# Patient Record
Sex: MALE | Race: WHITE | Employment: OTHER | URBAN - METROPOLITAN AREA
[De-identification: names, ages, dates, MRNs, and addresses within clinical notes are randomized per-mention and may not be internally consistent; named-entity substitution may affect disease eponyms.]

---

## 2019-06-20 PROBLEM — H25.11 AGE-RELATED NUCLEAR CATARACT, RIGHT EYE: Chronic | Status: ACTIVE | Noted: 2019-06-20

## 2019-06-24 PROBLEM — H25.11 AGE-RELATED NUCLEAR CATARACT, RIGHT EYE: Chronic | Status: RESOLVED | Noted: 2019-06-20 | Resolved: 2019-06-24

## 2019-06-27 PROBLEM — H25.12 AGE-RELATED NUCLEAR CATARACT, LEFT EYE: Chronic | Status: ACTIVE | Noted: 2019-06-27

## 2019-07-01 PROBLEM — H25.12 AGE-RELATED NUCLEAR CATARACT, LEFT EYE: Chronic | Status: RESOLVED | Noted: 2019-06-27 | Resolved: 2019-07-01

## 2021-06-11 ENCOUNTER — OFFICE VISIT (OUTPATIENT)
Dept: NEUROLOGY | Age: 76
End: 2021-06-11
Payer: COMMERCIAL

## 2021-06-11 VITALS
WEIGHT: 172 LBS | BODY MASS INDEX: 25.48 KG/M2 | DIASTOLIC BLOOD PRESSURE: 85 MMHG | HEIGHT: 69 IN | SYSTOLIC BLOOD PRESSURE: 144 MMHG | HEART RATE: 66 BPM | OXYGEN SATURATION: 96 %

## 2021-06-11 DIAGNOSIS — R41.89 COGNITIVE CHANGES: ICD-10-CM

## 2021-06-11 DIAGNOSIS — I63.9 CEREBROVASCULAR ACCIDENT (CVA), UNSPECIFIED MECHANISM (HCC): Primary | ICD-10-CM

## 2021-06-11 PROCEDURE — 99204 OFFICE O/P NEW MOD 45 MIN: CPT | Performed by: PSYCHIATRY & NEUROLOGY

## 2021-06-11 NOTE — PATIENT INSTRUCTIONS
A Healthy Lifestyle: Care Instructions  Your Care Instructions     A healthy lifestyle can help you feel good, stay at a healthy weight, and have plenty of energy for both work and play. A healthy lifestyle is something you can share with your whole family. A healthy lifestyle also can lower your risk for serious health problems, such as high blood pressure, heart disease, and diabetes. You can follow a few steps listed below to improve your health and the health of your family. Follow-up care is a key part of your treatment and safety. Be sure to make and go to all appointments, and call your doctor if you are having problems. It's also a good idea to know your test results and keep a list of the medicines you take. How can you care for yourself at home? · Do not eat too much sugar, fat, or fast foods. You can still have dessert and treats now and then. The goal is moderation. · Start small to improve your eating habits. Pay attention to portion sizes, drink less juice and soda pop, and eat more fruits and vegetables. ? Eat a healthy amount of food. A 3-ounce serving of meat, for example, is about the size of a deck of cards. Fill the rest of your plate with vegetables and whole grains. ? Limit the amount of soda and sports drinks you have every day. Drink more water when you are thirsty. ? Eat plenty of fruits and vegetables every day. Have an apple or some carrot sticks as an afternoon snack instead of a candy bar. Try to have fruits and/or vegetables at every meal.  · Make exercise part of your daily routine. You may want to start with simple activities, such as walking, bicycling, or slow swimming. Try to be active 30 to 60 minutes every day. You do not need to do all 30 to 60 minutes all at once. For example, you can exercise 3 times a day for 10 or 20 minutes.  Moderate exercise is safe for most people, but it is always a good idea to talk to your doctor before starting an exercise program.  · Keep moving. Rafael Smithtle the lawn, work in the garden, or [employer]. Take the stairs instead of the elevator at work. · If you smoke, quit. People who smoke have an increased risk for heart attack, stroke, cancer, and other lung illnesses. Quitting is hard, but there are ways to boost your chance of quitting tobacco for good. ? Use nicotine gum, patches, or lozenges. ? Ask your doctor about stop-smoking programs and medicines. ? Keep trying. In addition to reducing your risk of diseases in the future, you will notice some benefits soon after you stop using tobacco. If you have shortness of breath or asthma symptoms, they will likely get better within a few weeks after you quit. · Limit how much alcohol you drink. Moderate amounts of alcohol (up to 2 drinks a day for men, 1 drink a day for women) are okay. But drinking too much can lead to liver problems, high blood pressure, and other health problems. Family health  If you have a family, there are many things you can do together to improve your health. · Eat meals together as a family as often as possible. · Eat healthy foods. This includes fruits, vegetables, lean meats and dairy, and whole grains. · Include your family in your fitness plan. Most people think of activities such as jogging or tennis as the way to fitness, but there are many ways you and your family can be more active. Anything that makes you breathe hard and gets your heart pumping is exercise. Here are some tips:  ? Walk to do errands or to take your child to school or the bus.  ? Go for a family bike ride after dinner instead of watching TV. Where can you learn more? Go to http://www.gray.com/  Enter O459 in the search box to learn more about \"A Healthy Lifestyle: Care Instructions. \"  Current as of: September 23, 2020               Content Version: 12.8  © 9443-6547 Healthwise, Incorporated.    Care instructions adapted under license by [employer] (which disclaims liability or warranty for this information). If you have questions about a medical condition or this instruction, always ask your healthcare professional. [address] any warranty or liability for your use of this information.

## 2021-06-11 NOTE — PROGRESS NOTES
Referring Physician: Ericka Ricardo MD     Reason for Consultation:  Memory loss and abnormal MRI     Chief Complaint: Memory loss     History of Present Illness:   Ralph Phan is a 66 y.o. male with a history of hypertension hyperlipidemia and heart disease who presents to neurology clinic for evaluation of cognitive impairment. Patient and his wife report that he has been having symptoms for the last year. She reports that his main issue is difficulty with finding words and the names of places. He does feel like this is gradually worsening over time. He also does have some trouble with reading the newspaper and forgets what he reads. His wife did not note this this as much. He does feel like the symptoms are worse when he is tired as well as more anxious. Both his wife and the patient do not think he repeats himself or asked the same question multiple times. He does not frequently have difficulty with the month year or the day of the week. He does not have any difficulty with his medications or forgetting appointments. He is not driving as his wife felt like he would become distracted easily. He has not had any accidents nor has he gotten lost however he has not been driving for several years. He does manage all the finances and has not had any trouble with paying the bills or balancing his checkbook. He eats a well-balanced diet however he has been losing weight approximately 30 pounds in the last few years. He does not feel like this is intentional.  Does report his sleep is good and does not have any trouble getting to sleep or staying asleep. He denies any hallucinations or delusions. He has not lost any ability to do tasks that he previously used to. Physically however he does feel like he is not able to do as much as he did previously when he was younger. He has been more frustrated recently if he is not able to do a task correctly.   He does tell me that he had a heart attack in his 40s which required stents. Since that time he has been taking aspirin. He does have high blood pressure however most the time it is well controlled around systolics of 487. As he talked to his primary care regarding that she did have an MRI of the brain done which showed possibly ischemic changes in the left temporal lobe. A mass could not be excluded although there was no report of vasogenic edema. A repeat scan was recommended    Past Medical History:   Diagnosis Date    CAD (coronary artery disease)     Depression     GERD (gastroesophageal reflux disease)     Hypertension         Past Surgical History:   Procedure Laterality Date    HX CATARACT REMOVAL      CO CARDIAC SURG PROCEDURE UNLIST      cath with stents        Family History   Problem Relation Age of Onset    No Known Problems Mother     Heart Disease Father         Social History     Tobacco Use    Smoking status: Former Smoker     Quit date:      Years since quittin.4    Tobacco comment: quit 40 yrs ago   Substance Use Topics    Alcohol use: Not Currently     Alcohol/week: 14.0 standard drinks     Types: 14 Shots of liquor per week        No Known Allergies     Prior to Admission medications    Medication Sig Start Date End Date Taking? Authorizing Provider   vit A,C,E-zinc-copper (PRESERVISION AREDS) cap capsule Take 1 Cap by mouth daily. Yes Provider, Historical   amLODIPine (NORVASC) 10 mg tablet Take 10 mg by mouth daily. Yes Provider, Historical   aspirin delayed-release 81 mg tablet Take 81 mg by mouth daily. Yes Provider, Historical   atenolol (TENORMIN) 25 mg tablet Take 12.5 mg by mouth daily. Yes Provider, Historical   atorvastatin (LIPITOR) 40 mg tablet Take 80 mg by mouth daily. Yes Provider, Historical   lansoprazole (PREVACID) 30 mg capsule Take 30 mg by mouth daily. Yes Provider, Historical   lisinopril (PRINIVIL, ZESTRIL) 40 mg tablet Take 40 mg by mouth daily.    Yes Provider, Historical   sertraline (ZOLOFT) 100 mg tablet Take 100 mg by mouth daily. Yes Provider, Historical       Review of Systems:  General, constitutional: negative  Eyes, vision: negative  Ears, nose, throat: negative  Cardiovascular, heart: negative  Respiratory: negative  Gastrointestinal: negative  Genitourinary: negative  Musculoskeletal: negative  Skin and integumentary: negative  Psychiatric: negative  Endocrine: negative  Neurological: negative, except for HPI  Hematologic/lymphatic: negative  Allergy/immunology: negative    Visit Vitals  BP (!) 144/85   Pulse 66   Ht 5' 9\" (1.753 m)   Wt 172 lb (78 kg)   SpO2 96%   BMI 25.40 kg/m²       Physical Exam:  General:  appears well nourished in no acute distress  Neck: no carotid bruits  Lungs: clear to auscultation  Heart:  no murmurs, regular rate  Lower extremity: peripheral pulses palpable and no edema  Skin: intact    Neurological exam:    Mental Status: Awake, alert, oriented to person, place and time  Registration and Recall: registration intact, able to recall 2/3 words correctly at 5 min   Attention and Concentration: able to state the days of the week backwards   Speech and Language: No dysarthria. Able to name, repeat and follow commands   Fund of knowledge was preserved    Cranial nerves: II-XII  Pupils equal and reactive, visual fields intact by confrontation   Extraocular movements intact, no evidence of nystagmus or ptosis   Facial sensation intact   Facial movements symmetric   Hearing intact to soft rub bilaterally   Shoulder shrug symmetric and strong   Tongue protrusion full and midline without fasciculation or atrophy    Motor:   Normal tone and Bulk   Drift: No evidence of pronator drift     Strength testing:   deltoid triceps biceps Wrist ext. Wrist flex. intrinsics Hip flex. Hip ext. Knee ext. Knee flex Dorsi flex Plantar flex   Right 5 5 5 5 5 5 5 5 5 5 5 5   Left 5 5 5 5 5 5 5 5 5 5 5 5       Sensory:  Intact to light touch and pinprick.   There is no extinction    Reflexes:        Right        Left  Biceps       3    2  Triceps      2    2  Brachiorad. 3    2  Patellar      2    2  Achilles       2    2  Plantar Response       Down     Down   Hoffmans  Negative Negative       Cerebellar testing:  No dysmetria. finger-to-nose and heel-to- shin testing are within normal limits. Romberg: absent    Gait: steady    Data:     IMAGING   MRI brain   Of the MRI of the brain did reveal possible ischemic changes in the left temporal lobe. A mass could not be excluded and repeat imaging was recommended. Also right parietal occipital arachnoid cyst was noted which was likely incidental    Assessment and Plan   Kym Hamilton is a 66 y.o. male with a history of hypertension hyperlipidemia and heart disease who presents to neurology clinic for evaluation of cognitive impairment. His history reveals that his symptoms of difficulty with finding words which could be consistent with a stroke in the left temporal lobe. As this is getting worse it does raise a suspicion for malignancy especially given his weight loss. -     Obtain an MRI of the brain with and without contrast to determine if there is evidence of a stroke or mass in the left temporal lobe. This will help us determine etiology. -     I have also ordered a CTA head and neck to determine his atherosclerotic burden. - We will request laboratory studies from his PCP regarding an LDL, hemoglobin A1c and B12.  - I have placed an order for neuropsych testing however will likely see patient once the above imaging has been completed. -     Given that his symptoms are mainly due to speech, will refer patient to speech therapy. - Discussed treatment options in detail including risks/benefits and expectations. While medication is not likely to made a \"night and day\" difference, it may aid modestly in improving concentration and attention.   Medication titration and addition of adjunctive agents may be necessary to achieve maximum benefit. Explained that AD is a progressive disease process. - Discussed that he would benefit from designation of a POA to assist with executive decision making. Finances and medication administration should be monitored to ensure accuracy and prevent errors. - Patient currently has appropriate social/caregiver support in place.   Advanced care planning has been reviewed with the patient and family.    - Heart healthy diet and exercise  - Encouraged to limit screen time and spend more time reading or doing puzzles such as crosswords or александр Mora    Signed By:  Lizbeth Peterson MD     June 11, 2021

## 2021-06-27 ENCOUNTER — HOSPITAL ENCOUNTER (OUTPATIENT)
Dept: CT IMAGING | Age: 76
Discharge: HOME | End: 2021-06-27
Attending: PSYCHIATRY & NEUROLOGY
Payer: COMMERCIAL

## 2021-06-27 ENCOUNTER — HOSPITAL ENCOUNTER (OUTPATIENT)
Dept: MRI IMAGING | Age: 76
Discharge: HOME | End: 2021-06-27
Attending: PSYCHIATRY & NEUROLOGY
Payer: COMMERCIAL

## 2021-06-27 DIAGNOSIS — I63.9 CEREBROVASCULAR ACCIDENT (CVA), UNSPECIFIED MECHANISM (HCC): ICD-10-CM

## 2021-06-27 LAB — CREAT BLD-MCNC: 0.8 MG/DL (ref 0.6–1.3)

## 2021-06-27 PROCEDURE — A9576 INJ PROHANCE MULTIPACK: HCPCS | Performed by: PSYCHIATRY & NEUROLOGY

## 2021-06-27 PROCEDURE — 74011636320 HC RX REV CODE- 636/320: Performed by: PSYCHIATRY & NEUROLOGY

## 2021-06-27 PROCEDURE — 82565 ASSAY OF CREATININE: CPT

## 2021-06-27 PROCEDURE — 70498 CT ANGIOGRAPHY NECK: CPT

## 2021-06-27 PROCEDURE — 74011000636 HC RX REV CODE- 636: Performed by: PSYCHIATRY & NEUROLOGY

## 2021-06-27 PROCEDURE — 70553 MRI BRAIN STEM W/O & W/DYE: CPT

## 2021-06-27 RX ADMIN — GADOTERIDOL 15 ML: 279.3 INJECTION, SOLUTION INTRAVENOUS at 09:40

## 2021-06-27 RX ADMIN — IOPAMIDOL 100 ML: 755 INJECTION, SOLUTION INTRAVENOUS at 09:14

## 2021-07-06 ENCOUNTER — TELEPHONE (OUTPATIENT)
Dept: NEUROLOGY | Age: 76
End: 2021-07-06

## 2021-07-06 DIAGNOSIS — I63.9 CEREBROVASCULAR ACCIDENT (CVA), UNSPECIFIED MECHANISM (HCC): Primary | ICD-10-CM

## 2021-07-06 NOTE — TELEPHONE ENCOUNTER
Called to say she has located another speech therapist in [place] and needs a speech therapy order sent to fax [phone] and her name is Chaparrita Brando. Call when this is done. Phone number [phone].

## 2021-07-07 ENCOUNTER — TELEPHONE (OUTPATIENT)
Dept: NEUROLOGY | Age: 76
End: 2021-07-07

## 2021-07-07 DIAGNOSIS — I63.9 CEREBROVASCULAR ACCIDENT (CVA), UNSPECIFIED MECHANISM (HCC): Primary | ICD-10-CM

## 2021-07-07 NOTE — TELEPHONE ENCOUNTER
Please call, pt called Dr. Talavera Court office and they are not located in [facility]. They are at [facility]. Wants Dr. Taina Wagner or the nurse to call.

## 2021-07-09 ENCOUNTER — HOSPITAL ENCOUNTER (OUTPATIENT)
Dept: ULTRASOUND IMAGING | Age: 76
Discharge: HOME | End: 2021-07-09
Attending: PSYCHIATRY & NEUROLOGY
Payer: COMMERCIAL

## 2021-07-09 DIAGNOSIS — I63.9 CEREBROVASCULAR ACCIDENT (CVA), UNSPECIFIED MECHANISM (HCC): ICD-10-CM

## 2021-07-09 LAB
AV R PG: 75.27 MMHG
ECHO AO ROOT DIAM: 3.02 CM
ECHO AR MAX VEL PISA: 433.68 CM/S
ECHO AV PEAK GRADIENT: 4.66 MMHG
ECHO AV PEAK VELOCITY: 107.95 CM/S
ECHO AV REGURGITANT PHT: 619.1 MS
ECHO EST RA PRESSURE: 10 MMHG
ECHO LA AREA 4C: 24.74 CM2
ECHO LA MAJOR AXIS: 4.08 CM
ECHO LA VOL 2C: 92.09 ML (ref 18–58)
ECHO LA VOL 4C: 78.92 ML (ref 18–58)
ECHO LA VOL BP: 91.09 ML (ref 18–58)
ECHO LV E' SEPTAL VELOCITY: 8.52 CM/S
ECHO LV INTERNAL DIMENSION DIASTOLIC: 5.28 CM (ref 4.2–5.9)
ECHO LV INTERNAL DIMENSION SYSTOLIC: 3.61 CM
ECHO LV IVSD: 1.14 CM (ref 0.6–1)
ECHO LV MASS 2D: 229.1 G (ref 88–224)
ECHO LV POSTERIOR WALL DIASTOLIC: 1.08 CM (ref 0.6–1)
ECHO LVOT DIAM: 2.22 CM
ECHO MV A VELOCITY: 42.71 CM/S
ECHO MV E DECELERATION TIME (DT): 219.19 MS
ECHO MV E VELOCITY: 61.42 CM/S
ECHO MV E/A RATIO: 1.44
ECHO MV E/E' SEPTAL: 7.21
ECHO PV REGURGITANT MAX VELOCITY: 92.17 CM/S
ECHO RIGHT VENTRICULAR SYSTOLIC PRESSURE (RVSP): 35.72 MMHG
ECHO TV REGURGITANT MAX VELOCITY: 253.45 CM/S
ECHO TV REGURGITANT PEAK GRADIENT: 25.72 MMHG

## 2021-07-09 PROCEDURE — 93306 TTE W/DOPPLER COMPLETE: CPT

## 2021-07-09 PROCEDURE — 93306 TTE W/DOPPLER COMPLETE: CPT | Performed by: INTERNAL MEDICINE

## 2021-07-16 NOTE — TELEPHONE ENCOUNTER
I am not sure of a neuropsychologist in the [place] area, but will ask some of the other physicians. I have placed the order for the event monitor. It will be mailed to your house with instructions for use.

## 2021-07-16 NOTE — TELEPHONE ENCOUNTER
Called and spoke with [facility]. She is on the HIPAA release form. Notified patient and wife of test results. They would like to do the event monitor. Allen sneed stated they have been trying  to get an appointment for neuropsych. Two people are not available for 6 months. Would like someone in the [place] or [place] area.

## 2021-07-16 NOTE — TELEPHONE ENCOUNTER
Can you please let patient know that his echocardiogram was normal. This did not show any obvious causes of stroke. Since all of the other work up has not found a cause for his stroke, the last test I would like to do would be an event monitor. This evaluates his heart rhythm for 30 days to determine if there are any abnormal rhythms such as atrial fibrillation which may have caused his stroke.  If patient is agreeable, I will place the order

## 2021-08-02 ENCOUNTER — TELEPHONE (OUTPATIENT)
Dept: NEUROLOGY | Age: 76
End: 2021-08-02

## 2021-08-02 NOTE — TELEPHONE ENCOUNTER
Patient's wife Ruel Choe who is on the patient's PHI form called in regards to a heart monitor. Megan Pool stated that the patient was recommended a heart monitor by Dr. Jhon Dailey and that thy haven't heard anything in regards to this. Megan Pool would like for the order to be sent to their cardiologist office, [facility],  [phone], Fax [phone], celestino would like a phone call once this has been done. Megan Pool can be reached at [phone].

## 2021-08-10 ENCOUNTER — TELEPHONE (OUTPATIENT)
Dept: NEUROLOGY | Age: 76
End: 2021-08-10

## 2021-08-10 DIAGNOSIS — I49.9 CARDIAC ARRHYTHMIA, UNSPECIFIED CARDIAC ARRHYTHMIA TYPE: ICD-10-CM

## 2021-08-10 DIAGNOSIS — I63.9 CEREBROVASCULAR ACCIDENT (CVA), UNSPECIFIED MECHANISM (HCC): Primary | ICD-10-CM

## 2021-08-10 NOTE — TELEPHONE ENCOUNTER
Received call from pt's wife, she stated that the order for the pt to have a hear monitor was received by cardiology, but the time frame for the heart monitor was not specifed, and the procedure code is incorrect and needs to be corrected. A new order needs to be sent to them and procedure code. She said that she asked for a call back when this was done the first time, but she never received a call. Please call when this is done.

## 2021-08-10 NOTE — TELEPHONE ENCOUNTER
Please new order for the event monitor. Can you just call the family let them know once you have sent it over.  Thank you

## 2024-07-23 ENCOUNTER — APPOINTMENT (OUTPATIENT)
Facility: HOSPITAL | Age: 79
End: 2024-07-23
Payer: COMMERCIAL

## 2024-07-23 ENCOUNTER — HOSPITAL ENCOUNTER (EMERGENCY)
Facility: HOSPITAL | Age: 79
Discharge: HOME | End: 2024-07-23
Attending: EMERGENCY MEDICINE
Payer: COMMERCIAL

## 2024-07-23 VITALS
WEIGHT: 169 LBS | HEIGHT: 69 IN | TEMPERATURE: 98.6 F | RESPIRATION RATE: 17 BRPM | OXYGEN SATURATION: 96 % | HEART RATE: 60 BPM | DIASTOLIC BLOOD PRESSURE: 94 MMHG | SYSTOLIC BLOOD PRESSURE: 177 MMHG | BODY MASS INDEX: 25.03 KG/M2

## 2024-07-23 DIAGNOSIS — R07.9 CHEST PAIN, UNSPECIFIED TYPE: Primary | ICD-10-CM

## 2024-07-23 LAB
ALBUMIN SERPL-MCNC: 3.7 G/DL (ref 3.5–5)
ALBUMIN/GLOB SERPL: 1 (ref 1.1–2.2)
ALP SERPL-CCNC: 110 U/L (ref 45–117)
ALT SERPL-CCNC: 21 U/L (ref 12–78)
ANION GAP SERPL CALC-SCNC: 6 MMOL/L (ref 5–15)
AST SERPL-CCNC: 25 U/L (ref 15–37)
BASOPHILS # BLD: 0 K/UL (ref 0–0.1)
BASOPHILS NFR BLD: 1 % (ref 0–1)
BILIRUB SERPL-MCNC: 1 MG/DL (ref 0.2–1)
BUN SERPL-MCNC: 11 MG/DL (ref 6–20)
BUN/CREAT SERPL: 13 (ref 12–20)
CALCIUM SERPL-MCNC: 9 MG/DL (ref 8.5–10.1)
CHLORIDE SERPL-SCNC: 102 MMOL/L (ref 97–108)
CO2 SERPL-SCNC: 32 MMOL/L (ref 21–32)
CREAT SERPL-MCNC: 0.86 MG/DL (ref 0.7–1.3)
DIFFERENTIAL METHOD BLD: NORMAL
EKG ATRIAL RATE: 63 BPM
EKG DIAGNOSIS: NORMAL
EKG P AXIS: 56 DEGREES
EKG P-R INTERVAL: 172 MS
EKG Q-T INTERVAL: 418 MS
EKG QRS DURATION: 84 MS
EKG QTC CALCULATION (BAZETT): 427 MS
EKG R AXIS: 14 DEGREES
EKG T AXIS: 37 DEGREES
EKG VENTRICULAR RATE: 63 BPM
EOSINOPHIL # BLD: 0.1 K/UL (ref 0–0.4)
EOSINOPHIL NFR BLD: 1 % (ref 0–7)
ERYTHROCYTE [DISTWIDTH] IN BLOOD BY AUTOMATED COUNT: 12.6 % (ref 11.5–14.5)
GLOBULIN SER CALC-MCNC: 3.6 G/DL (ref 2–4)
GLUCOSE SERPL-MCNC: 97 MG/DL (ref 65–100)
HCT VFR BLD AUTO: 42.5 % (ref 36.6–50.3)
HGB BLD-MCNC: 15.1 G/DL (ref 12.1–17)
IMM GRANULOCYTES # BLD AUTO: 0 K/UL (ref 0–0.04)
IMM GRANULOCYTES NFR BLD AUTO: 0 % (ref 0–0.5)
LIPASE SERPL-CCNC: 78 U/L (ref 13–75)
LYMPHOCYTES # BLD: 1.4 K/UL (ref 0.8–3.5)
LYMPHOCYTES NFR BLD: 17 % (ref 12–49)
MAGNESIUM SERPL-MCNC: 2 MG/DL (ref 1.6–2.4)
MCH RBC QN AUTO: 33.6 PG (ref 26–34)
MCHC RBC AUTO-ENTMCNC: 35.5 G/DL (ref 30–36.5)
MCV RBC AUTO: 94.7 FL (ref 80–99)
MONOCYTES # BLD: 0.9 K/UL (ref 0–1)
MONOCYTES NFR BLD: 11 % (ref 5–13)
NEUTS SEG # BLD: 5.8 K/UL (ref 1.8–8)
NEUTS SEG NFR BLD: 70 % (ref 32–75)
NRBC # BLD: 0 K/UL (ref 0–0.01)
NRBC BLD-RTO: 0 PER 100 WBC
PLATELET # BLD AUTO: 186 K/UL (ref 150–400)
PMV BLD AUTO: 11.3 FL (ref 8.9–12.9)
POTASSIUM SERPL-SCNC: 4.6 MMOL/L (ref 3.5–5.1)
PROT SERPL-MCNC: 7.3 G/DL (ref 6.4–8.2)
RBC # BLD AUTO: 4.49 M/UL (ref 4.1–5.7)
SODIUM SERPL-SCNC: 140 MMOL/L (ref 136–145)
TROPONIN I SERPL HS-MCNC: 11 NG/L (ref 0–76)
TROPONIN I SERPL HS-MCNC: 9 NG/L (ref 0–76)
WBC # BLD AUTO: 8.2 K/UL (ref 4.1–11.1)

## 2024-07-23 PROCEDURE — 71046 X-RAY EXAM CHEST 2 VIEWS: CPT

## 2024-07-23 PROCEDURE — 84484 ASSAY OF TROPONIN QUANT: CPT

## 2024-07-23 PROCEDURE — 83690 ASSAY OF LIPASE: CPT

## 2024-07-23 PROCEDURE — 83735 ASSAY OF MAGNESIUM: CPT

## 2024-07-23 PROCEDURE — 80053 COMPREHEN METABOLIC PANEL: CPT

## 2024-07-23 PROCEDURE — 99285 EMERGENCY DEPT VISIT HI MDM: CPT

## 2024-07-23 PROCEDURE — 85025 COMPLETE CBC W/AUTO DIFF WBC: CPT

## 2024-07-23 PROCEDURE — 36415 COLL VENOUS BLD VENIPUNCTURE: CPT

## 2024-07-23 NOTE — ED TRIAGE NOTES
Pt presents with chest pain x 4 hours per wife. Pt has dementia but wife reports he has been more confused over the last few days than his baseline dementia.

## 2024-07-24 ENCOUNTER — TELEPHONE (OUTPATIENT)
Age: 79
End: 2024-07-24

## 2024-07-24 NOTE — TELEPHONE ENCOUNTER
Patient wife Emma called to schedule a er follow up with Dr. Tate for chest pain.    # [phone]

## 2024-07-26 NOTE — ED PROVIDER NOTES
EMERGENCY DEPARTMENT HISTORY AND PHYSICAL EXAM      Date: 7/23/2024  Patient Name: Olu Garcia    History of Presenting Illness     Chief Complaint   Patient presents with    Chest Pain       History Provided By: Patient    HPI: Olu Garcia, 78 y.o. male with PMHx as noted below presents the emergency department ration of chest pain.  History obtained from patient and patient's wife.  History is somewhat limited due to the patient's cognitive decline per wife.  Reports that he was complaining of some chest pain earlier today.  This is since resolved, patient has no complaints at this time and is chest pain-free.  Reports that the pain was not pleuritic or exertional.  PCP: César Ray MD    No current facility-administered medications for this encounter.     Current Outpatient Medications   Medication Sig Dispense Refill    amLODIPine (NORVASC) 10 MG tablet Take 10 mg by mouth daily      aspirin 81 MG EC tablet Take 81 mg by mouth daily      atenolol (TENORMIN) 25 MG tablet Take 12.5 mg by mouth daily      atorvastatin (LIPITOR) 40 MG tablet Take 80 mg by mouth daily      lansoprazole (PREVACID) 30 MG delayed release capsule Take 30 mg by mouth daily      lisinopril (PRINIVIL;ZESTRIL) 40 MG tablet Take 40 mg by mouth daily      sertraline (ZOLOFT) 100 MG tablet Take 100 mg by mouth daily         Past History     Past Medical History:  Past Medical History:   Diagnosis Date    CAD (coronary artery disease)     Depression     GERD (gastroesophageal reflux disease)     Hypertension        Past Surgical History:  Past Surgical History:   Procedure Laterality Date    CATARACT REMOVAL      [place] UNLISTED PROCEDURE CARDIAC SURGERY      cath with stents       Family History:  Family History   Problem Relation Age of Onset    No Known Problems Mother     Heart Disease Father        Social History:  Social History     Tobacco Use    Smoking status: Former     Current packs/day: 0.00     Types: Cigarettes     Quit

## 2024-07-30 LAB
EKG ATRIAL RATE: 63 BPM
EKG DIAGNOSIS: NORMAL
EKG P AXIS: 56 DEGREES
EKG P-R INTERVAL: 172 MS
EKG Q-T INTERVAL: 418 MS
EKG QRS DURATION: 84 MS
EKG QTC CALCULATION (BAZETT): 427 MS
EKG R AXIS: 14 DEGREES
EKG T AXIS: 37 DEGREES
EKG VENTRICULAR RATE: 63 BPM

## 2024-08-15 ENCOUNTER — HOSPITAL ENCOUNTER (EMERGENCY)
Facility: HOSPITAL | Age: 79
Discharge: HOME | End: 2024-08-15
Attending: EMERGENCY MEDICINE
Payer: COMMERCIAL

## 2024-08-15 ENCOUNTER — APPOINTMENT (OUTPATIENT)
Facility: HOSPITAL | Age: 79
End: 2024-08-15
Payer: COMMERCIAL

## 2024-08-15 VITALS
DIASTOLIC BLOOD PRESSURE: 95 MMHG | HEIGHT: 70 IN | RESPIRATION RATE: 16 BRPM | OXYGEN SATURATION: 96 % | SYSTOLIC BLOOD PRESSURE: 178 MMHG | TEMPERATURE: 98 F | BODY MASS INDEX: 23.62 KG/M2 | WEIGHT: 165 LBS | HEART RATE: 67 BPM

## 2024-08-15 DIAGNOSIS — T14.8XXA ABRASION: ICD-10-CM

## 2024-08-15 DIAGNOSIS — S09.90XA INJURY OF HEAD, INITIAL ENCOUNTER: Primary | ICD-10-CM

## 2024-08-15 PROCEDURE — 70450 CT HEAD/BRAIN W/O DYE: CPT

## 2024-08-15 PROCEDURE — 90715 TDAP VACCINE 7 YRS/> IM: CPT | Performed by: EMERGENCY MEDICINE

## 2024-08-15 PROCEDURE — 6360000002 HC RX W HCPCS: Performed by: EMERGENCY MEDICINE

## 2024-08-15 PROCEDURE — 90471 IMMUNIZATION ADMIN: CPT | Performed by: EMERGENCY MEDICINE

## 2024-08-15 PROCEDURE — 99284 EMERGENCY DEPT VISIT MOD MDM: CPT

## 2024-08-15 PROCEDURE — 6370000000 HC RX 637 (ALT 250 FOR IP): Performed by: EMERGENCY MEDICINE

## 2024-08-15 RX ORDER — BACITRACIN ZINC 500 [USP'U]/G
OINTMENT TOPICAL ONCE
Status: COMPLETED | OUTPATIENT
Start: 2024-08-15 | End: 2024-08-15

## 2024-08-15 RX ORDER — BACITRACIN ZINC 500 [USP'U]/G
OINTMENT TOPICAL 2 TIMES DAILY
Status: DISCONTINUED | OUTPATIENT
Start: 2024-08-15 | End: 2024-08-15

## 2024-08-15 RX ADMIN — TETANUS TOXOID, REDUCED DIPHTHERIA TOXOID AND ACELLULAR PERTUSSIS VACCINE, ADSORBED 0.5 ML: 5; 2.5; 8; 8; 2.5 SUSPENSION INTRAMUSCULAR at 15:36

## 2024-08-15 RX ADMIN — BACITRACIN ZINC: 500 OINTMENT TOPICAL at 16:25

## 2024-08-15 ASSESSMENT — LIFESTYLE VARIABLES
HOW OFTEN DO YOU HAVE A DRINK CONTAINING ALCOHOL: NEVER
HOW MANY STANDARD DRINKS CONTAINING ALCOHOL DO YOU HAVE ON A TYPICAL DAY: PATIENT DOES NOT DRINK

## 2024-08-15 ASSESSMENT — PAIN SCALES - GENERAL: PAINLEVEL_OUTOF10: 3

## 2024-08-15 ASSESSMENT — PAIN - FUNCTIONAL ASSESSMENT: PAIN_FUNCTIONAL_ASSESSMENT: 0-10

## 2024-08-15 ASSESSMENT — PAIN DESCRIPTION - LOCATION: LOCATION: HEAD

## 2024-08-15 NOTE — ED TRIAGE NOTES
Outside picking vegetables and tripped and fell  onto the concrete.  No LOC. Has and abrasion and hematoma to the right forehead.  Seen by [facility] staff has ice applied.

## 2024-08-15 NOTE — ED PROVIDER NOTES
[facility] EMERGENCY DEP  EMERGENCY DEPARTMENT ENCOUNTER       Pt Name: Olu Garcia  MRN: [id number]  Birthdate 1945  Date of evaluation: 8/15/2024  Provider: Adeline Mitchell MD   PCP: César Ray MD  Note Started: 3:21 PM EDT 8/15/24     CHIEF COMPLAINT       Chief Complaint   Patient presents with   • Head Injury     From a fall        HISTORY OF PRESENT ILLNESS: 1 or more elements      History From: patient/wife, History limited by: none     Olu Garcia is a 78 y.o. male presents to the emergency department complaining of head injury.  Patient reports he was outside and fell forward, tripped on pots while gardening.  Hit his forehead on concrete.  No LOC.  No vomiting.  Patient is acting at baseline according to wife.       Please See MDM for Additional Details of the HPI/PMH  Nursing Notes were all reviewed and agreed with or any disagreements were addressed in the HPI.     REVIEW OF SYSTEMS        Positives and Pertinent negatives as per HPI.    PAST HISTORY     Past Medical History:  Past Medical History:   Diagnosis Date   • CAD (coronary artery disease)    • Depression    • GERD (gastroesophageal reflux disease)    • Hypertension        Past Surgical History:  Past Surgical History:   Procedure Laterality Date   • CATARACT REMOVAL     • [place] UNLISTED PROCEDURE CARDIAC SURGERY      cath with stents       Family History:  Family History   Problem Relation Age of Onset   • No Known Problems Mother    • Heart Disease Father        Social History:  Social History     Tobacco Use   • Smoking status: Former     Current packs/day: 0.00     Types: Cigarettes     Quit date: 1990     Years since quittin.6   • Tobacco comments:     Quit smoking: quit 40 yrs ago   Substance Use Topics   • Alcohol use: Not Currently     Alcohol/week: 14.0 standard drinks of alcohol       Allergies:  No Known Allergies    CURRENT MEDICATIONS      Discharge Medication List as of 8/15/2024  4:12 PM        CONTINUE

## 2024-08-15 NOTE — ED NOTES
Patient educated on medications to be administered. Verified  Allergies. Pain medication administered as ordered. Bed rails up and call bell within reach.

## 2024-08-21 PROBLEM — E78.00 HYPERCHOLESTEROLEMIA: Status: ACTIVE | Noted: 2024-08-21

## 2024-08-21 PROBLEM — I10 ESSENTIAL HYPERTENSION: Status: ACTIVE | Noted: 2024-08-21

## 2024-08-21 PROBLEM — I25.10 ASCVD (ARTERIOSCLEROTIC CARDIOVASCULAR DISEASE): Status: ACTIVE | Noted: 2024-08-21

## 2024-08-21 NOTE — PROGRESS NOTES
ASSESSMENT and PLAN  1. Chest discomfort  Evaluate further with Lexiscan Cardiolite stress test and echocardiogram.    2. ASCVD (arteriosclerotic cardiovascular disease)  Database incomplete.  No ischemia on Avis Cardiolite 5/2/2019.  Continue current cardioprotective medications and risk factor modification efforts.  Schedule echo and Lexiscan Cardiolite stress test to evaluate recent chest symptoms.    3. Essential hypertension  Well-controlled.  Continue current medications.    4. Hypercholesterolemia  On atorvastatin and followed by PCP.  LDL <70.       We will contact him with the results of his cardiac studies and any additional recommendations.  The patient has been instructed and agrees to call our office with any issues or other concerns related to their cardiac condition(s) and/or complaint(s).      CHIEF COMPLAINT  Chest discomfort    HPI:    Olu Garcia is a 78 y.o. male referred from the ER for consultation regarding chest pain.  He presented to the [facility] ER on 7/23/2024 for evaluation of chest discomfort that occurred earlier in the day.  He was symptom-free on presentation to the ER and high-sensitivity troponins were 11 and 9 and ECG was nonischemic.  He was discharged to home and referred for consultation.  He is here today accompanied by his wife.  She states he had some chest soreness for 2 days after the ER presentation and in retrospect she thinks the discomfort was musculoskeletal related to recent weightlifting.  Up until 2 weeks ago, he was participating in fitness and yoga classes at our  but he is activity level has deteriorated in the past several weeks due to dementia.  He denies orthopnea, PND, lower extremity edema, palpitations, syncope or near syncope.  His history is notable for an MI in 1989 and 2 stents in [place] in [place] but I do not have any details.  With his most recent cardiac studies are normal Lexiscan Cardiolite study 5/2019 and echo 7/2021

## 2024-08-28 ENCOUNTER — OFFICE VISIT (OUTPATIENT)
Age: 79
End: 2024-08-28
Payer: COMMERCIAL

## 2024-08-28 VITALS
SYSTOLIC BLOOD PRESSURE: 120 MMHG | DIASTOLIC BLOOD PRESSURE: 84 MMHG | OXYGEN SATURATION: 98 % | RESPIRATION RATE: 20 BRPM | TEMPERATURE: 98.3 F | HEART RATE: 57 BPM | HEIGHT: 70 IN | WEIGHT: 170 LBS | BODY MASS INDEX: 24.34 KG/M2

## 2024-08-28 DIAGNOSIS — E78.00 HYPERCHOLESTEROLEMIA: ICD-10-CM

## 2024-08-28 DIAGNOSIS — R07.9 CHEST PAIN, UNSPECIFIED TYPE: ICD-10-CM

## 2024-08-28 DIAGNOSIS — I10 ESSENTIAL HYPERTENSION: ICD-10-CM

## 2024-08-28 DIAGNOSIS — R07.89 CHEST DISCOMFORT: Primary | ICD-10-CM

## 2024-08-28 DIAGNOSIS — I25.10 ASCVD (ARTERIOSCLEROTIC CARDIOVASCULAR DISEASE): ICD-10-CM

## 2024-08-28 PROCEDURE — 99204 OFFICE O/P NEW MOD 45 MIN: CPT | Performed by: INTERNAL MEDICINE

## 2024-08-28 PROCEDURE — 3074F SYST BP LT 130 MM HG: CPT | Performed by: INTERNAL MEDICINE

## 2024-08-28 PROCEDURE — 1123F ACP DISCUSS/DSCN MKR DOCD: CPT | Performed by: INTERNAL MEDICINE

## 2024-08-28 PROCEDURE — 3079F DIAST BP 80-89 MM HG: CPT | Performed by: INTERNAL MEDICINE

## 2024-08-28 RX ORDER — CHOLESTYRAMINE LIGHT 4 G/5.7G
POWDER, FOR SUSPENSION ORAL
COMMUNITY
Start: 2024-06-28

## 2024-08-28 RX ORDER — VIT A/VIT C/VIT E/ZINC/COPPER 4296-226
1 CAPSULE ORAL DAILY
COMMUNITY

## 2024-08-28 RX ORDER — TAMSULOSIN HYDROCHLORIDE 0.4 MG/1
0.4 CAPSULE ORAL DAILY
COMMUNITY
Start: 2024-06-28

## 2024-08-28 ASSESSMENT — PATIENT HEALTH QUESTIONNAIRE - PHQ9
SUM OF ALL RESPONSES TO PHQ9 QUESTIONS 1 & 2: 0
SUM OF ALL RESPONSES TO PHQ QUESTIONS 1-9: 0
1. LITTLE INTEREST OR PLEASURE IN DOING THINGS: NOT AT ALL
2. FEELING DOWN, DEPRESSED OR HOPELESS: NOT AT ALL
SUM OF ALL RESPONSES TO PHQ QUESTIONS 1-9: 0

## 2024-08-28 NOTE — PATIENT INSTRUCTIONS
I have ordered an echocardiogram and a Lexiscan Cardiolite stress test for further evaluation of your heart.  We will contact you with the results

## 2024-08-28 NOTE — PROGRESS NOTES
Identified pt with two pt identifiers(name and ). Reviewed record in preparation for visit and have obtained necessary documentation.  Chief Complaint   Patient presents with    New Patient    Coronary Artery Disease    Hypertension    Cholesterol Problem    Chest Pain      /84 (Site: Left Upper Arm, Position: Sitting, Cuff Size: Medium Adult)   Pulse 57   Temp 98.3 °F (36.8 °C) (Temporal)   Resp 20   Ht 1.778 m (5' 10\")   Wt 77.1 kg (170 lb)   SpO2 98%   BMI 24.39 kg/m²       Medications reviewed/approved by provider.      Health Maintenance Review: Patient reminded of \"due or due soon\" health maintenance. I have asked the patient to contact his/her primary care provider (PCP) for follow-up on his/her health maintenance.    Coordination of Care Questionnaire:  :   1) Have you been to an emergency room, urgent care, or hospitalized since your last visit?  If yes, where when, and reason for visit? no       2. Have seen or consulted any other health care provider since your last visit?   If yes, where when, and reason for visit?  no      Patient is accompanied by spouse I have received verbal consent from Olu Garcia to discuss any/all medical information while they are present in the room.

## 2024-09-24 ENCOUNTER — TELEPHONE (OUTPATIENT)
Facility: HOSPITAL | Age: 79
End: 2024-09-24

## 2024-09-26 ENCOUNTER — HOSPITAL ENCOUNTER (OUTPATIENT)
Facility: HOSPITAL | Age: 79
Discharge: HOME | End: 2024-09-29
Attending: INTERNAL MEDICINE
Payer: COMMERCIAL

## 2024-09-26 ENCOUNTER — HOSPITAL ENCOUNTER (OUTPATIENT)
Facility: HOSPITAL | Age: 79
Discharge: HOME | End: 2024-09-28
Attending: INTERNAL MEDICINE
Payer: COMMERCIAL

## 2024-09-26 ENCOUNTER — HOSPITAL ENCOUNTER (OUTPATIENT)
Facility: HOSPITAL | Age: 79
Setting detail: RECURRING SERIES
Discharge: HOME | End: 2024-09-29
Attending: INTERNAL MEDICINE
Payer: COMMERCIAL

## 2024-09-26 DIAGNOSIS — R07.89 CHEST DISCOMFORT: ICD-10-CM

## 2024-09-26 DIAGNOSIS — I25.10 ASCVD (ARTERIOSCLEROTIC CARDIOVASCULAR DISEASE): ICD-10-CM

## 2024-09-26 LAB
ECHO AO ASC DIAM: 3.7 CM
ECHO AO ROOT DIAM: 2.9 CM
ECHO AR MAX VEL PISA: 4.8 M/S
ECHO AV PEAK GRADIENT: 7 MMHG
ECHO AV PEAK VELOCITY: 1.3 M/S
ECHO AV REGURGITANT PHT: 536.2 MILLISECOND
ECHO EST RA PRESSURE: 3 MMHG
ECHO LA DIAMETER: 4.5 CM
ECHO LA TO AORTIC ROOT RATIO: 1.55
ECHO LA VOL A-L A2C: 127 ML (ref 18–58)
ECHO LA VOL A-L A4C: 113 ML (ref 18–58)
ECHO LA VOL MOD A2C: 122 ML (ref 18–58)
ECHO LA VOL MOD A4C: 103 ML (ref 18–58)
ECHO LA VOLUME AREA LENGTH: 126 ML
ECHO LV E' LATERAL VELOCITY: 9.4 CM/S
ECHO LV E' SEPTAL VELOCITY: 9.5 CM/S
ECHO LV EDV A2C: 131 ML
ECHO LV EDV A4C: 121 ML
ECHO LV EDV BP: 131 ML (ref 67–155)
ECHO LV EF PHYSICIAN: 60 %
ECHO LV EJECTION FRACTION A2C: 50 %
ECHO LV EJECTION FRACTION A4C: 54 %
ECHO LV ESV A2C: 66 ML
ECHO LV ESV A4C: 56 ML
ECHO LV ESV BP: 64 ML (ref 22–58)
ECHO LV FRACTIONAL SHORTENING: 17 % (ref 28–44)
ECHO LV INTERNAL DIMENSION DIASTOLIC: 5.4 CM (ref 4.2–5.9)
ECHO LV INTERNAL DIMENSION SYSTOLIC: 4.5 CM
ECHO LV IVSD: 1.1 CM (ref 0.6–1)
ECHO LV MASS 2D: 234.8 G (ref 88–224)
ECHO LV POSTERIOR WALL DIASTOLIC: 1.1 CM (ref 0.6–1)
ECHO LV RELATIVE WALL THICKNESS RATIO: 0.41
ECHO LVOT AREA: 3.8 CM2
ECHO LVOT DIAM: 2.2 CM
ECHO MV A VELOCITY: 0.33 M/S
ECHO MV E DECELERATION TIME (DT): 192.3 MS
ECHO MV E VELOCITY: 0.65 M/S
ECHO MV E/A RATIO: 1.97
ECHO MV E/E' LATERAL: 6.91
ECHO MV E/E' RATIO (AVERAGED): 6.88
ECHO MV E/E' SEPTAL: 6.84
ECHO PULMONARY ARTERY END DIASTOLIC PRESSURE: 3 MMHG
ECHO PULMONARY ARTERY SYSTOLIC PRESSURE (PASP): 48 MMHG
ECHO PV REGURGITANT MAX VELOCITY: 0.9 M/S
ECHO RA AREA 4C: 27.6 CM2
ECHO RIGHT VENTRICULAR SYSTOLIC PRESSURE (RVSP): 47 MMHG
ECHO RV BASAL DIMENSION: 5.2 CM
ECHO RV TAPSE: 3.5 CM (ref 1.7–?)
ECHO TV REGURGITANT MAX VELOCITY: 3.33 M/S
ECHO TV REGURGITANT PEAK GRADIENT: 44 MMHG
NUC STRESS EJECTION FRACTION: 63 %
STRESS BASELINE DIAS BP: 93 MMHG
STRESS BASELINE HR: 61 BPM
STRESS BASELINE SYS BP: 147 MMHG
STRESS ESTIMATED WORKLOAD: 1 METS
STRESS EXERCISE DUR MIN: 2 MIN
STRESS EXERCISE DUR SEC: 4 SEC
STRESS PEAK DIAS BP: 95 MMHG
STRESS PEAK SYS BP: 148 MMHG
STRESS PERCENT HR ACHIEVED: 52 %
STRESS POST PEAK HR: 73 BPM
STRESS RATE PRESSURE PRODUCT: NORMAL BPM*MMHG
STRESS ST DEPRESSION: 0 MM
STRESS STAGE 1 BP: NORMAL MMHG
STRESS STAGE 1 DURATION: 1 MIN:SEC
STRESS STAGE 1 HR: 62 BPM
STRESS STAGE 2 DURATION: 1 MIN:SEC
STRESS STAGE 2 HR: 73 BPM
STRESS STAGE 3 DURATION: NORMAL MIN:SEC
STRESS STAGE 3 HR: 73 BPM
STRESS STAGE RECOVERY 1 BP: NORMAL MMHG
STRESS STAGE RECOVERY 1 DURATION: NORMAL MIN:SEC
STRESS STAGE RECOVERY 1 HR: 70 BPM
STRESS TARGET HR: 140 BPM
TID: 1.05

## 2024-09-26 PROCEDURE — 3430000000 HC RX DIAGNOSTIC RADIOPHARMACEUTICAL: Performed by: INTERNAL MEDICINE

## 2024-09-26 PROCEDURE — 93016 CV STRESS TEST SUPVJ ONLY: CPT | Performed by: INTERNAL MEDICINE

## 2024-09-26 PROCEDURE — 93018 CV STRESS TEST I&R ONLY: CPT | Performed by: INTERNAL MEDICINE

## 2024-09-26 PROCEDURE — 93017 CV STRESS TEST TRACING ONLY: CPT

## 2024-09-26 PROCEDURE — 6360000002 HC RX W HCPCS: Performed by: INTERNAL MEDICINE

## 2024-09-26 PROCEDURE — 93306 TTE W/DOPPLER COMPLETE: CPT

## 2024-09-26 PROCEDURE — A9500 TC99M SESTAMIBI: HCPCS | Performed by: INTERNAL MEDICINE

## 2024-09-26 PROCEDURE — 93306 TTE W/DOPPLER COMPLETE: CPT | Performed by: INTERNAL MEDICINE

## 2024-09-26 PROCEDURE — 78452 HT MUSCLE IMAGE SPECT MULT: CPT | Performed by: INTERNAL MEDICINE

## 2024-09-26 RX ORDER — TETRAKIS(2-METHOXYISOBUTYLISOCYANIDE)COPPER(I) TETRAFLUOROBORATE 1 MG/ML
10.6 INJECTION, POWDER, LYOPHILIZED, FOR SOLUTION INTRAVENOUS
Status: COMPLETED | OUTPATIENT
Start: 2024-09-26 | End: 2024-09-26

## 2024-09-26 RX ORDER — REGADENOSON 0.08 MG/ML
0.4 INJECTION, SOLUTION INTRAVENOUS
Status: COMPLETED | OUTPATIENT
Start: 2024-09-26 | End: 2024-09-26

## 2024-09-26 RX ORDER — TETRAKIS(2-METHOXYISOBUTYLISOCYANIDE)COPPER(I) TETRAFLUOROBORATE 1 MG/ML
33 INJECTION, POWDER, LYOPHILIZED, FOR SOLUTION INTRAVENOUS
Status: COMPLETED | OUTPATIENT
Start: 2024-09-26 | End: 2024-09-26

## 2024-09-26 RX ADMIN — TETRAKIS(2-METHOXYISOBUTYLISOCYANIDE)COPPER(I) TETRAFLUOROBORATE 10.6 MILLICURIE: 1 INJECTION, POWDER, LYOPHILIZED, FOR SOLUTION INTRAVENOUS at 06:47

## 2024-09-26 RX ADMIN — TETRAKIS(2-METHOXYISOBUTYLISOCYANIDE)COPPER(I) TETRAFLUOROBORATE 33 MILLICURIE: 1 INJECTION, POWDER, LYOPHILIZED, FOR SOLUTION INTRAVENOUS at 07:38

## 2024-09-26 RX ADMIN — REGADENOSON 0.4 MG: 0.08 INJECTION, SOLUTION INTRAVENOUS at 07:37

## 2024-10-23 ENCOUNTER — APPOINTMENT (OUTPATIENT)
Facility: HOSPITAL | Age: 79
End: 2024-10-23
Payer: COMMERCIAL

## 2024-10-23 ENCOUNTER — HOSPITAL ENCOUNTER (EMERGENCY)
Facility: HOSPITAL | Age: 79
Discharge: HOME | End: 2024-10-23
Attending: EMERGENCY MEDICINE
Payer: COMMERCIAL

## 2024-10-23 VITALS
OXYGEN SATURATION: 98 % | TEMPERATURE: 98.8 F | DIASTOLIC BLOOD PRESSURE: 69 MMHG | RESPIRATION RATE: 23 BRPM | BODY MASS INDEX: 24.44 KG/M2 | HEIGHT: 69 IN | WEIGHT: 165 LBS | HEART RATE: 75 BPM | SYSTOLIC BLOOD PRESSURE: 130 MMHG

## 2024-10-23 DIAGNOSIS — R50.9 FEVER, UNSPECIFIED FEVER CAUSE: ICD-10-CM

## 2024-10-23 DIAGNOSIS — N30.01 ACUTE CYSTITIS WITH HEMATURIA: Primary | ICD-10-CM

## 2024-10-23 LAB
ANION GAP SERPL CALC-SCNC: 6 MMOL/L (ref 2–12)
APPEARANCE UR: ABNORMAL
BACTERIA URNS QL MICRO: ABNORMAL /HPF
BASOPHILS # BLD: 0 K/UL (ref 0–0.1)
BASOPHILS NFR BLD: 0 % (ref 0–1)
BILIRUB UR QL: NEGATIVE
BUN SERPL-MCNC: 14 MG/DL (ref 6–20)
BUN/CREAT SERPL: 14 (ref 12–20)
CALCIUM SERPL-MCNC: 9.4 MG/DL (ref 8.5–10.1)
CHLORIDE SERPL-SCNC: 102 MMOL/L (ref 97–108)
CO2 SERPL-SCNC: 28 MMOL/L (ref 21–32)
COLOR UR: ABNORMAL
CREAT SERPL-MCNC: 0.99 MG/DL (ref 0.7–1.3)
DIFFERENTIAL METHOD BLD: ABNORMAL
EOSINOPHIL # BLD: 0 K/UL (ref 0–0.4)
EOSINOPHIL NFR BLD: 0 % (ref 0–7)
EPITH CASTS URNS QL MICRO: ABNORMAL /LPF
ERYTHROCYTE [DISTWIDTH] IN BLOOD BY AUTOMATED COUNT: 12.7 % (ref 11.5–14.5)
GLUCOSE SERPL-MCNC: 100 MG/DL (ref 65–100)
GLUCOSE UR STRIP.AUTO-MCNC: NEGATIVE MG/DL
HCT VFR BLD AUTO: 42.8 % (ref 36.6–50.3)
HGB BLD-MCNC: 14.5 G/DL (ref 12.1–17)
HGB UR QL STRIP: ABNORMAL
IMM GRANULOCYTES # BLD AUTO: 0 K/UL (ref 0–0.04)
IMM GRANULOCYTES NFR BLD AUTO: 0 % (ref 0–0.5)
KETONES UR QL STRIP.AUTO: ABNORMAL MG/DL
LEUKOCYTE ESTERASE UR QL STRIP.AUTO: ABNORMAL
LYMPHOCYTES # BLD: 0.3 K/UL (ref 0.8–3.5)
LYMPHOCYTES NFR BLD: 2 % (ref 12–49)
MCH RBC QN AUTO: 32.7 PG (ref 26–34)
MCHC RBC AUTO-ENTMCNC: 33.9 G/DL (ref 30–36.5)
MCV RBC AUTO: 96.4 FL (ref 80–99)
MONOCYTES # BLD: 0.8 K/UL (ref 0–1)
MONOCYTES NFR BLD: 6 % (ref 5–13)
NEUTS SEG # BLD: 12.2 K/UL (ref 1.8–8)
NEUTS SEG NFR BLD: 92 % (ref 32–75)
NITRITE UR QL STRIP.AUTO: NEGATIVE
NRBC # BLD: 0 K/UL (ref 0–0.01)
NRBC BLD-RTO: 0 PER 100 WBC
PH UR STRIP: 6.5 (ref 5–8)
PLATELET # BLD AUTO: 148 K/UL (ref 150–400)
PMV BLD AUTO: 11.2 FL (ref 8.9–12.9)
POTASSIUM SERPL-SCNC: 4.1 MMOL/L (ref 3.5–5.1)
PROT UR STRIP-MCNC: 100 MG/DL
RBC # BLD AUTO: 4.44 M/UL (ref 4.1–5.7)
RBC #/AREA URNS HPF: ABNORMAL /HPF (ref 0–5)
RBC MORPH BLD: ABNORMAL
SODIUM SERPL-SCNC: 136 MMOL/L (ref 136–145)
SP GR UR REFRACTOMETRY: 1.02 (ref 1–1.03)
URINE CULTURE IF INDICATED: ABNORMAL
UROBILINOGEN UR QL STRIP.AUTO: 1 EU/DL (ref 0.2–1)
WBC # BLD AUTO: 13.3 K/UL (ref 4.1–11.1)
WBC URNS QL MICRO: ABNORMAL /HPF (ref 0–4)

## 2024-10-23 PROCEDURE — 81001 URINALYSIS AUTO W/SCOPE: CPT

## 2024-10-23 PROCEDURE — 87186 SC STD MICRODIL/AGAR DIL: CPT

## 2024-10-23 PROCEDURE — 99284 EMERGENCY DEPT VISIT MOD MDM: CPT

## 2024-10-23 PROCEDURE — 6370000000 HC RX 637 (ALT 250 FOR IP): Performed by: EMERGENCY MEDICINE

## 2024-10-23 PROCEDURE — 71045 X-RAY EXAM CHEST 1 VIEW: CPT

## 2024-10-23 PROCEDURE — 87086 URINE CULTURE/COLONY COUNT: CPT

## 2024-10-23 PROCEDURE — 85025 COMPLETE CBC W/AUTO DIFF WBC: CPT

## 2024-10-23 PROCEDURE — 6360000002 HC RX W HCPCS: Performed by: EMERGENCY MEDICINE

## 2024-10-23 PROCEDURE — 2580000003 HC RX 258: Performed by: EMERGENCY MEDICINE

## 2024-10-23 PROCEDURE — 80048 BASIC METABOLIC PNL TOTAL CA: CPT

## 2024-10-23 PROCEDURE — 96374 THER/PROPH/DIAG INJ IV PUSH: CPT

## 2024-10-23 PROCEDURE — 87088 URINE BACTERIA CULTURE: CPT

## 2024-10-23 PROCEDURE — 36415 COLL VENOUS BLD VENIPUNCTURE: CPT

## 2024-10-23 RX ORDER — ACETAMINOPHEN 325 MG/1
650 TABLET ORAL
Status: COMPLETED | OUTPATIENT
Start: 2024-10-23 | End: 2024-10-23

## 2024-10-23 RX ORDER — CEFDINIR 300 MG/1
300 CAPSULE ORAL 2 TIMES DAILY
Qty: 20 CAPSULE | Refills: 0 | Status: SHIPPED | OUTPATIENT
Start: 2024-10-23 | End: 2024-10-23

## 2024-10-23 RX ORDER — CEFDINIR 300 MG/1
300 CAPSULE ORAL 2 TIMES DAILY
Qty: 20 CAPSULE | Refills: 0 | Status: SHIPPED | OUTPATIENT
Start: 2024-10-23 | End: 2024-11-02

## 2024-10-23 RX ADMIN — WATER 1000 MG: 1 INJECTION INTRAMUSCULAR; INTRAVENOUS; SUBCUTANEOUS at 16:44

## 2024-10-23 RX ADMIN — ACETAMINOPHEN 650 MG: 325 TABLET ORAL at 15:30

## 2024-10-23 ASSESSMENT — ENCOUNTER SYMPTOMS
SHORTNESS OF BREATH: 0
SORE THROAT: 0
DIARRHEA: 0
ABDOMINAL PAIN: 0
EYE REDNESS: 0
NAUSEA: 0
VOMITING: 0
COUGH: 0

## 2024-10-23 ASSESSMENT — PAIN - FUNCTIONAL ASSESSMENT
PAIN_FUNCTIONAL_ASSESSMENT: 0-10
PAIN_FUNCTIONAL_ASSESSMENT: NONE - DENIES PAIN
PAIN_FUNCTIONAL_ASSESSMENT: 0-10

## 2024-10-23 ASSESSMENT — PAIN SCALES - GENERAL
PAINLEVEL_OUTOF10: 0

## 2024-10-23 NOTE — ED NOTES
C staff arrived pt was able to get off stretcher and into wheelchair with minimal assistance.  Pt remains awake and alert

## 2024-10-23 NOTE — ED NOTES
Per pt and family  pt was tested for flu/covid at [facility] and they were negative.  Provider updated I will cancel test

## 2024-10-23 NOTE — ED PROVIDER NOTES
EMERGENCY DEPARTMENT HISTORY AND PHYSICAL EXAM      Date: 10/23/2024  Patient Name: Olu Garcia    History of Presenting Illness     Chief Complaint   Patient presents with    Fever       History Provided By: Patient     HPI: Olu Garcia, 78 y.o. male with past medical history listed below, presents via private vehicle to the ED with cc of fever.  Patient has a history of dementia and is in independent living at  [facility] facility.  He reported to the medical clinic today and had a temperature of 102.5 and was sent here for further evaluation.        There are no other complaints, changes, or physical findings at this time.    PCP: César Ray MD    No current facility-administered medications on file prior to encounter.     Current Outpatient Medications on File Prior to Encounter   Medication Sig Dispense Refill    cholestyramine light 4 g packet       Cyanocobalamin 500 MCG SUBL Place 1 tablet under the tongue daily      Multiple Vitamins-Minerals (PRESERVISION AREDS) CAPS Take 1 capsule by mouth daily      tamsulosin (FLOMAX) 0.4 MG capsule Take 1 capsule by mouth daily      Cholecalciferol (VITAMIN D-3 PO) Take by mouth      aspirin 81 MG EC tablet Take 1 tablet by mouth daily      atenolol (TENORMIN) 25 MG tablet Take 0.5 tablets by mouth daily      atorvastatin (LIPITOR) 40 MG tablet Take 2 tablets by mouth daily      lansoprazole (PREVACID) 30 MG delayed release capsule Take 1 capsule by mouth daily      lisinopril (PRINIVIL;ZESTRIL) 40 MG tablet Take 1 tablet by mouth daily      sertraline (ZOLOFT) 100 MG tablet Take 1.5 tablets by mouth daily         Past History     Past Medical History:  Past Medical History:   Diagnosis Date    CAD (coronary artery disease)     Depression     GERD (gastroesophageal reflux disease)     Hypertension        Past Surgical History:  Past Surgical History:   Procedure Laterality Date    CATARACT REMOVAL      [facility] UNLISTED PROCEDURE CARDIAC SURGERY      cath with

## 2024-10-23 NOTE — ED NOTES
Nataly called from [facility] to check on patient.  Nataly was updated pt will be coming back to [facility] with a diagnosis of UTI and will be giving him antibiotic before he leaves.  Nataly reports pts prescription will have to be sent  to [facility]

## 2024-10-26 LAB
BACTERIA SPEC CULT: ABNORMAL
CC UR VC: ABNORMAL
SERVICE CMNT-IMP: ABNORMAL

## 2024-10-30 ENCOUNTER — HOSPITAL ENCOUNTER (OUTPATIENT)
Facility: HOSPITAL | Age: 79
Setting detail: SPECIMEN
Discharge: HOME | End: 2024-11-02
Payer: COMMERCIAL

## 2024-10-30 PROCEDURE — 87086 URINE CULTURE/COLONY COUNT: CPT

## 2024-11-02 LAB
BACTERIA SPEC CULT: NORMAL
CC UR VC: NORMAL
SERVICE CMNT-IMP: NORMAL

## 2024-11-07 ENCOUNTER — APPOINTMENT (OUTPATIENT)
Facility: HOSPITAL | Age: 79
DRG: 872 | End: 2024-11-07
Attending: EMERGENCY MEDICINE
Payer: MEDICARE

## 2024-11-07 ENCOUNTER — HOSPITAL ENCOUNTER (INPATIENT)
Facility: HOSPITAL | Age: 79
LOS: 6 days | Discharge: HOME | DRG: 872 | End: 2024-11-13
Attending: EMERGENCY MEDICINE | Admitting: HOSPITALIST
Payer: MEDICARE

## 2024-11-07 DIAGNOSIS — N39.0 URINARY TRACT INFECTION WITHOUT HEMATURIA, SITE UNSPECIFIED: ICD-10-CM

## 2024-11-07 DIAGNOSIS — A41.9 SEPSIS, DUE TO UNSPECIFIED ORGANISM, UNSPECIFIED WHETHER ACUTE ORGAN DYSFUNCTION PRESENT (HCC): Primary | ICD-10-CM

## 2024-11-07 DIAGNOSIS — I24.9 ACUTE CORONARY SYNDROME (HCC): ICD-10-CM

## 2024-11-07 PROBLEM — R65.20 SEVERE SEPSIS WITH ACUTE ORGAN DYSFUNCTION (HCC): Status: ACTIVE | Noted: 2024-11-07

## 2024-11-07 LAB
ALBUMIN SERPL-MCNC: 2.6 G/DL (ref 3.5–5)
ALBUMIN/GLOB SERPL: 0.8 (ref 1.1–2.2)
ALP SERPL-CCNC: 84 U/L (ref 45–117)
ALT SERPL-CCNC: 21 U/L (ref 12–78)
ANION GAP SERPL CALC-SCNC: 10 MMOL/L (ref 2–12)
APPEARANCE UR: ABNORMAL
AST SERPL-CCNC: 29 U/L (ref 15–37)
BACTERIA URNS QL MICRO: ABNORMAL /HPF
BASOPHILS # BLD: 0 K/UL (ref 0–0.1)
BASOPHILS NFR BLD: 0 % (ref 0–1)
BILIRUB SERPL-MCNC: 1.1 MG/DL (ref 0.2–1)
BILIRUB UR QL: NEGATIVE
BUN SERPL-MCNC: 19 MG/DL (ref 6–20)
BUN/CREAT SERPL: 13 (ref 12–20)
CALCIUM SERPL-MCNC: 8.5 MG/DL (ref 8.5–10.1)
CHLORIDE SERPL-SCNC: 102 MMOL/L (ref 97–108)
CO2 SERPL-SCNC: 25 MMOL/L (ref 21–32)
COLOR UR: ABNORMAL
CREAT SERPL-MCNC: 1.45 MG/DL (ref 0.7–1.3)
DIFFERENTIAL METHOD BLD: ABNORMAL
EOSINOPHIL # BLD: 0 K/UL (ref 0–0.4)
EOSINOPHIL NFR BLD: 0 % (ref 0–7)
EPITH CASTS URNS QL MICRO: ABNORMAL /LPF
ERYTHROCYTE [DISTWIDTH] IN BLOOD BY AUTOMATED COUNT: 12.7 % (ref 11.5–14.5)
FLUAV RNA SPEC QL NAA+PROBE: NOT DETECTED
FLUBV RNA SPEC QL NAA+PROBE: NOT DETECTED
GLOBULIN SER CALC-MCNC: 3.3 G/DL (ref 2–4)
GLUCOSE SERPL-MCNC: 106 MG/DL (ref 65–100)
GLUCOSE UR STRIP.AUTO-MCNC: NEGATIVE MG/DL
HCT VFR BLD AUTO: 39.4 % (ref 36.6–50.3)
HGB BLD-MCNC: 13.3 G/DL (ref 12.1–17)
HGB UR QL STRIP: ABNORMAL
IMM GRANULOCYTES # BLD AUTO: 0.5 K/UL (ref 0–0.04)
IMM GRANULOCYTES NFR BLD AUTO: 2 % (ref 0–0.5)
KETONES UR QL STRIP.AUTO: NEGATIVE MG/DL
LACTATE SERPL-SCNC: 1.4 MMOL/L (ref 0.4–2)
LACTATE SERPL-SCNC: 2.2 MMOL/L (ref 0.4–2)
LEUKOCYTE ESTERASE UR QL STRIP.AUTO: ABNORMAL
LYMPHOCYTES # BLD: 0.2 K/UL (ref 0.8–3.5)
LYMPHOCYTES NFR BLD: 1 % (ref 12–49)
MCH RBC QN AUTO: 31.7 PG (ref 26–34)
MCHC RBC AUTO-ENTMCNC: 33.8 G/DL (ref 30–36.5)
MCV RBC AUTO: 94 FL (ref 80–99)
MONOCYTES # BLD: 0.9 K/UL (ref 0–1)
MONOCYTES NFR BLD: 4 % (ref 5–13)
MUCOUS THREADS URNS QL MICRO: ABNORMAL /LPF
NEUTS SEG # BLD: 20.9 K/UL (ref 1.8–8)
NEUTS SEG NFR BLD: 93 % (ref 32–75)
NITRITE UR QL STRIP.AUTO: NEGATIVE
NRBC # BLD: 0 K/UL (ref 0–0.01)
NRBC BLD-RTO: 0 PER 100 WBC
PH UR STRIP: 6 (ref 5–8)
PLATELET # BLD AUTO: 202 K/UL (ref 150–400)
PMV BLD AUTO: 11.7 FL (ref 8.9–12.9)
POTASSIUM SERPL-SCNC: 4.5 MMOL/L (ref 3.5–5.1)
PROCALCITONIN SERPL-MCNC: 28.28 NG/ML
PROT SERPL-MCNC: 5.9 G/DL (ref 6.4–8.2)
PROT UR STRIP-MCNC: 100 MG/DL
RBC # BLD AUTO: 4.19 M/UL (ref 4.1–5.7)
RBC #/AREA URNS HPF: >100 /HPF (ref 0–5)
RBC MORPH BLD: ABNORMAL
SARS-COV-2 RNA RESP QL NAA+PROBE: NOT DETECTED
SODIUM SERPL-SCNC: 137 MMOL/L (ref 136–145)
SOURCE: NORMAL
SP GR UR REFRACTOMETRY: 1.01 (ref 1–1.03)
TROPONIN I SERPL HS-MCNC: 130 NG/L (ref 0–76)
TROPONIN I SERPL HS-MCNC: 146 NG/L (ref 0–76)
TROPONIN I SERPL HS-MCNC: 165 NG/L (ref 0–76)
URINE CULTURE IF INDICATED: ABNORMAL
UROBILINOGEN UR QL STRIP.AUTO: 0.2 EU/DL (ref 0.2–1)
WBC # BLD AUTO: 22.5 K/UL (ref 4.1–11.1)
WBC URNS QL MICRO: >100 /HPF (ref 0–4)

## 2024-11-07 PROCEDURE — 99285 EMERGENCY DEPT VISIT HI MDM: CPT

## 2024-11-07 PROCEDURE — 1100000000 HC RM PRIVATE

## 2024-11-07 PROCEDURE — 83605 ASSAY OF LACTIC ACID: CPT

## 2024-11-07 PROCEDURE — 2580000003 HC RX 258: Performed by: EMERGENCY MEDICINE

## 2024-11-07 PROCEDURE — 87086 URINE CULTURE/COLONY COUNT: CPT

## 2024-11-07 PROCEDURE — 96365 THER/PROPH/DIAG IV INF INIT: CPT

## 2024-11-07 PROCEDURE — 84484 ASSAY OF TROPONIN QUANT: CPT

## 2024-11-07 PROCEDURE — 87636 SARSCOV2 & INF A&B AMP PRB: CPT

## 2024-11-07 PROCEDURE — 87077 CULTURE AEROBIC IDENTIFY: CPT

## 2024-11-07 PROCEDURE — 36415 COLL VENOUS BLD VENIPUNCTURE: CPT

## 2024-11-07 PROCEDURE — 84145 PROCALCITONIN (PCT): CPT

## 2024-11-07 PROCEDURE — 87154 CUL TYP ID BLD PTHGN 6+ TRGT: CPT

## 2024-11-07 PROCEDURE — 81001 URINALYSIS AUTO W/SCOPE: CPT

## 2024-11-07 PROCEDURE — 96366 THER/PROPH/DIAG IV INF ADDON: CPT

## 2024-11-07 PROCEDURE — 87040 BLOOD CULTURE FOR BACTERIA: CPT

## 2024-11-07 PROCEDURE — 87186 SC STD MICRODIL/AGAR DIL: CPT

## 2024-11-07 PROCEDURE — 87088 URINE BACTERIA CULTURE: CPT

## 2024-11-07 PROCEDURE — 6370000000 HC RX 637 (ALT 250 FOR IP): Performed by: EMERGENCY MEDICINE

## 2024-11-07 PROCEDURE — 6360000002 HC RX W HCPCS: Performed by: EMERGENCY MEDICINE

## 2024-11-07 PROCEDURE — 93005 ELECTROCARDIOGRAM TRACING: CPT | Performed by: EMERGENCY MEDICINE

## 2024-11-07 PROCEDURE — 80053 COMPREHEN METABOLIC PANEL: CPT

## 2024-11-07 PROCEDURE — 71045 X-RAY EXAM CHEST 1 VIEW: CPT

## 2024-11-07 PROCEDURE — 85025 COMPLETE CBC W/AUTO DIFF WBC: CPT

## 2024-11-07 RX ORDER — PANTOPRAZOLE SODIUM 40 MG/1
40 TABLET, DELAYED RELEASE ORAL
Status: DISCONTINUED | OUTPATIENT
Start: 2024-11-08 | End: 2024-11-13 | Stop reason: HOSPADM

## 2024-11-07 RX ORDER — ACETAMINOPHEN 650 MG/1
650 SUPPOSITORY RECTAL EVERY 6 HOURS PRN
Status: DISCONTINUED | OUTPATIENT
Start: 2024-11-07 | End: 2024-11-13 | Stop reason: HOSPADM

## 2024-11-07 RX ORDER — ACETAMINOPHEN 500 MG
1000 TABLET ORAL
Status: COMPLETED | OUTPATIENT
Start: 2024-11-07 | End: 2024-11-07

## 2024-11-07 RX ORDER — 0.9 % SODIUM CHLORIDE 0.9 %
30 INTRAVENOUS SOLUTION INTRAVENOUS ONCE
Status: COMPLETED | OUTPATIENT
Start: 2024-11-07 | End: 2024-11-07

## 2024-11-07 RX ORDER — MAGNESIUM HYDROXIDE/ALUMINUM HYDROXICE/SIMETHICONE 120; 1200; 1200 MG/30ML; MG/30ML; MG/30ML
30 SUSPENSION ORAL EVERY 6 HOURS PRN
Status: DISCONTINUED | OUTPATIENT
Start: 2024-11-07 | End: 2024-11-13 | Stop reason: HOSPADM

## 2024-11-07 RX ORDER — ATENOLOL 25 MG/1
12.5 TABLET ORAL DAILY
Status: DISCONTINUED | OUTPATIENT
Start: 2024-11-07 | End: 2024-11-13 | Stop reason: HOSPADM

## 2024-11-07 RX ORDER — ATORVASTATIN CALCIUM 40 MG/1
80 TABLET, FILM COATED ORAL DAILY
Status: DISCONTINUED | OUTPATIENT
Start: 2024-11-07 | End: 2024-11-13 | Stop reason: HOSPADM

## 2024-11-07 RX ORDER — SODIUM CHLORIDE 0.9 % (FLUSH) 0.9 %
5-40 SYRINGE (ML) INJECTION EVERY 12 HOURS SCHEDULED
Status: DISCONTINUED | OUTPATIENT
Start: 2024-11-07 | End: 2024-11-13 | Stop reason: HOSPADM

## 2024-11-07 RX ORDER — ACETAMINOPHEN 325 MG/1
650 TABLET ORAL EVERY 6 HOURS PRN
Status: DISCONTINUED | OUTPATIENT
Start: 2024-11-07 | End: 2024-11-13 | Stop reason: HOSPADM

## 2024-11-07 RX ORDER — ACETAMINOPHEN 325 MG/1
650 TABLET ORAL EVERY 4 HOURS PRN
Status: DISCONTINUED | OUTPATIENT
Start: 2024-11-07 | End: 2024-11-07 | Stop reason: SDUPTHER

## 2024-11-07 RX ORDER — ENOXAPARIN SODIUM 100 MG/ML
40 INJECTION SUBCUTANEOUS DAILY
Status: DISCONTINUED | OUTPATIENT
Start: 2024-11-08 | End: 2024-11-13 | Stop reason: HOSPADM

## 2024-11-07 RX ORDER — TAMSULOSIN HYDROCHLORIDE 0.4 MG/1
0.4 CAPSULE ORAL DAILY
Status: DISCONTINUED | OUTPATIENT
Start: 2024-11-07 | End: 2024-11-13 | Stop reason: HOSPADM

## 2024-11-07 RX ORDER — ASPIRIN 81 MG/1
81 TABLET ORAL DAILY
Status: DISCONTINUED | OUTPATIENT
Start: 2024-11-07 | End: 2024-11-13 | Stop reason: HOSPADM

## 2024-11-07 RX ORDER — LISINOPRIL 20 MG/1
40 TABLET ORAL DAILY
Status: DISCONTINUED | OUTPATIENT
Start: 2024-11-07 | End: 2024-11-08

## 2024-11-07 RX ORDER — SODIUM CHLORIDE 0.9 % (FLUSH) 0.9 %
5-40 SYRINGE (ML) INJECTION PRN
Status: DISCONTINUED | OUTPATIENT
Start: 2024-11-07 | End: 2024-11-13 | Stop reason: HOSPADM

## 2024-11-07 RX ORDER — SODIUM CHLORIDE 9 MG/ML
INJECTION, SOLUTION INTRAVENOUS PRN
Status: DISCONTINUED | OUTPATIENT
Start: 2024-11-07 | End: 2024-11-13 | Stop reason: HOSPADM

## 2024-11-07 RX ORDER — ONDANSETRON 2 MG/ML
4 INJECTION INTRAMUSCULAR; INTRAVENOUS EVERY 6 HOURS PRN
Status: DISCONTINUED | OUTPATIENT
Start: 2024-11-07 | End: 2024-11-13 | Stop reason: HOSPADM

## 2024-11-07 RX ADMIN — CEFEPIME 2000 MG: 2 INJECTION, POWDER, FOR SOLUTION INTRAVENOUS at 12:36

## 2024-11-07 RX ADMIN — ACETAMINOPHEN 1000 MG: 500 TABLET ORAL at 12:36

## 2024-11-07 RX ADMIN — SODIUM CHLORIDE 2328 ML: 9 INJECTION, SOLUTION INTRAVENOUS at 12:42

## 2024-11-07 ASSESSMENT — PAIN SCALES - GENERAL
PAINLEVEL_OUTOF10: 0
PAINLEVEL_OUTOF10: 0

## 2024-11-07 ASSESSMENT — LIFESTYLE VARIABLES
HOW MANY STANDARD DRINKS CONTAINING ALCOHOL DO YOU HAVE ON A TYPICAL DAY: PATIENT DOES NOT DRINK
HOW OFTEN DO YOU HAVE A DRINK CONTAINING ALCOHOL: NEVER

## 2024-11-07 ASSESSMENT — PAIN - FUNCTIONAL ASSESSMENT: PAIN_FUNCTIONAL_ASSESSMENT: 0-10

## 2024-11-07 NOTE — ED PROVIDER NOTES
[facility] EMERGENCY DEP  EMERGENCY DEPARTMENT ENCOUNTER       Pt Name: Olu Garcia  MRN: [id number]  Birthdate 1945  Date of evaluation: 2024  Provider: Adeline Mitchell MD   PCP: César Ray MD  Note Started: 12:36 PM EST 24     CHIEF COMPLAINT       Chief Complaint   Patient presents with    Fever        HISTORY OF PRESENT ILLNESS: 1 or more elements      History From: EMS, primary care doctor, History limited by: Dementia     Olu Garcia is a 78 y.o. male presents emergency department complaining of fever and altered mental status.       Please See MDM for Additional Details of the HPI/PMH  Nursing Notes were all reviewed and agreed with or any disagreements were addressed in the HPI.     REVIEW OF SYSTEMS        Positives and Pertinent negatives as per HPI.    PAST HISTORY     Past Medical History:  Past Medical History:   Diagnosis Date    CAD (coronary artery disease)     Depression     GERD (gastroesophageal reflux disease)     Hypertension        Past Surgical History:  Past Surgical History:   Procedure Laterality Date    CATARACT REMOVAL      DC UNLISTED PROCEDURE CARDIAC SURGERY      cath with stents       Family History:  Family History   Problem Relation Age of Onset    No Known Problems Mother     Heart Disease Father        Social History:  Social History     Tobacco Use    Smoking status: Former     Current packs/day: 0.00     Types: Cigarettes     Quit date: 1990     Years since quittin.8    Tobacco comments:     Quit smoking: quit 40 yrs ago   Substance Use Topics    Alcohol use: Not Currently     Alcohol/week: 14.0 standard drinks of alcohol       Allergies:  No Known Allergies    CURRENT MEDICATIONS      Previous Medications    ASPIRIN 81 MG EC TABLET    Take 1 tablet by mouth daily    ATENOLOL (TENORMIN) 25 MG TABLET    Take 0.5 tablets by mouth daily    ATORVASTATIN (LIPITOR) 40 MG TABLET    Take 2 tablets by mouth daily    CHOLECALCIFEROL (VITAMIN D-3 PO)

## 2024-11-07 NOTE — ED TRIAGE NOTES
Pt arrived by EMS for fever and lethargy.  EMS reports pt is from [facility] and staff reported that they checked on the pt at 0800 this morning and noted him to be lethargic, diaphoretic, incontinent of urine and a rectal temp of 103.5. No tylenol was given and pt has not had his AM medications. Staff did 3 rapid Covid test and were negative.  EMS arrived and found pt to have room air SpO2 of 88% and placed on 4L NC with improvement to 95%, Co2 20-22, lethargic with poor skin turgor and Rhonci. , IV placed and 400 ml of NS administered.  Pt has dementia at baseline. Pt arrived awake and alert.  Pt educated on ER flow

## 2024-11-08 ENCOUNTER — APPOINTMENT (OUTPATIENT)
Facility: HOSPITAL | Age: 79
DRG: 872 | End: 2024-11-08
Payer: MEDICARE

## 2024-11-08 PROBLEM — K21.9 GERD (GASTROESOPHAGEAL REFLUX DISEASE): Chronic | Status: ACTIVE | Noted: 2024-11-08

## 2024-11-08 PROBLEM — R79.89 ELEVATED TROPONIN: Status: ACTIVE | Noted: 2024-11-08

## 2024-11-08 LAB
ACB COMPLEX DNA BLD POS QL NAA+NON-PROBE: NOT DETECTED
ACCESSION NUMBER, LLC1M: ABNORMAL
ALBUMIN SERPL-MCNC: 2.4 G/DL (ref 3.5–5)
ALBUMIN/GLOB SERPL: 0.6 (ref 1.1–2.2)
ALP SERPL-CCNC: 84 U/L (ref 45–117)
ALT SERPL-CCNC: 30 U/L (ref 12–78)
ANION GAP SERPL CALC-SCNC: 5 MMOL/L (ref 2–12)
AST SERPL-CCNC: 44 U/L (ref 15–37)
B FRAGILIS DNA BLD POS QL NAA+NON-PROBE: NOT DETECTED
BASOPHILS # BLD: 0 K/UL (ref 0–0.1)
BASOPHILS NFR BLD: 0 % (ref 0–1)
BILIRUB SERPL-MCNC: 0.7 MG/DL (ref 0.2–1)
BIOFIRE TEST COMMENT: ABNORMAL
BLACTX-M ISLT/SPM QL: NOT DETECTED
BLAIMP ISLT/SPM QL: NOT DETECTED
BLAKPC ISLT/SPM QL: NOT DETECTED
BLAOXA-48-LIKE ISLT/SPM QL: NOT DETECTED
BLAVIM ISLT/SPM QL: NOT DETECTED
BUN SERPL-MCNC: 24 MG/DL (ref 6–20)
BUN/CREAT SERPL: 21 (ref 12–20)
C ALBICANS DNA BLD POS QL NAA+NON-PROBE: NOT DETECTED
C AURIS DNA BLD POS QL NAA+NON-PROBE: NOT DETECTED
C GATTII+NEOFOR DNA BLD POS QL NAA+N-PRB: NOT DETECTED
C GLABRATA DNA BLD POS QL NAA+NON-PROBE: NOT DETECTED
C KRUSEI DNA BLD POS QL NAA+NON-PROBE: NOT DETECTED
C PARAP DNA BLD POS QL NAA+NON-PROBE: NOT DETECTED
C TROPICLS DNA BLD POS QL NAA+NON-PROBE: NOT DETECTED
CALCIUM SERPL-MCNC: 8.8 MG/DL (ref 8.5–10.1)
CHLORIDE SERPL-SCNC: 103 MMOL/L (ref 97–108)
CO2 SERPL-SCNC: 31 MMOL/L (ref 21–32)
COLISTIN RES MCR-1 ISLT/SPM QL: NOT DETECTED
CREAT SERPL-MCNC: 1.12 MG/DL (ref 0.7–1.3)
DIFFERENTIAL METHOD BLD: ABNORMAL
E CLOAC COMP DNA BLD POS NAA+NON-PROBE: NOT DETECTED
E COLI DNA BLD POS QL NAA+NON-PROBE: NOT DETECTED
E FAECALIS DNA BLD POS QL NAA+NON-PROBE: NOT DETECTED
E FAECIUM DNA BLD POS QL NAA+NON-PROBE: NOT DETECTED
ECHO AO ROOT DIAM: 3.3 CM
ECHO AO ROOT INDEX: 1.71 CM/M2
ECHO AR MAX VEL PISA: 3.7 M/S
ECHO AV PEAK GRADIENT: 7 MMHG
ECHO AV PEAK VELOCITY: 1.3 M/S
ECHO AV REGURGITANT PHT: 497.9 MILLISECOND
ECHO BSA: 1.94 M2
ECHO EST RA PRESSURE: 15 MMHG
ECHO LA DIAMETER INDEX: 2.54 CM/M2
ECHO LA DIAMETER: 4.9 CM
ECHO LA TO AORTIC ROOT RATIO: 1.48
ECHO LA VOL A-L A2C: 127 ML (ref 18–58)
ECHO LA VOL A-L A4C: 149 ML (ref 18–58)
ECHO LA VOL MOD A2C: 120 ML (ref 18–58)
ECHO LA VOL MOD A4C: 140 ML (ref 18–58)
ECHO LA VOLUME AREA LENGTH: 145 ML
ECHO LA VOLUME INDEX A-L A2C: 66 ML/M2 (ref 16–34)
ECHO LA VOLUME INDEX A-L A4C: 77 ML/M2 (ref 16–34)
ECHO LA VOLUME INDEX AREA LENGTH: 75 ML/M2 (ref 16–34)
ECHO LA VOLUME INDEX MOD A2C: 62 ML/M2 (ref 16–34)
ECHO LA VOLUME INDEX MOD A4C: 73 ML/M2 (ref 16–34)
ECHO LV E' LATERAL VELOCITY: 8.9 CM/S
ECHO LV E' SEPTAL VELOCITY: 7.5 CM/S
ECHO LV EDV A2C: 147 ML
ECHO LV EDV A4C: 114 ML
ECHO LV EDV BP: 131 ML (ref 67–155)
ECHO LV EDV INDEX A4C: 59 ML/M2
ECHO LV EDV INDEX BP: 68 ML/M2
ECHO LV EDV NDEX A2C: 76 ML/M2
ECHO LV EJECTION FRACTION A2C: 53 %
ECHO LV EJECTION FRACTION A4C: 39 %
ECHO LV EJECTION FRACTION BIPLANE: 46 % (ref 55–100)
ECHO LV ESV A2C: 69 ML
ECHO LV ESV A4C: 70 ML
ECHO LV ESV BP: 71 ML (ref 22–58)
ECHO LV ESV INDEX A2C: 36 ML/M2
ECHO LV ESV INDEX A4C: 36 ML/M2
ECHO LV ESV INDEX BP: 37 ML/M2
ECHO LV FRACTIONAL SHORTENING: 24 % (ref 28–44)
ECHO LV INTERNAL DIMENSION DIASTOLE INDEX: 2.12 CM/M2
ECHO LV INTERNAL DIMENSION DIASTOLIC: 4.1 CM (ref 4.2–5.9)
ECHO LV INTERNAL DIMENSION SYSTOLIC INDEX: 1.61 CM/M2
ECHO LV INTERNAL DIMENSION SYSTOLIC: 3.1 CM
ECHO LV IVSD: 1.3 CM (ref 0.6–1)
ECHO LV MASS 2D: 216.6 G (ref 88–224)
ECHO LV MASS INDEX 2D: 112.2 G/M2 (ref 49–115)
ECHO LV POSTERIOR WALL DIASTOLIC: 1.5 CM (ref 0.6–1)
ECHO LV RELATIVE WALL THICKNESS RATIO: 0.73
ECHO LVOT AREA: 3.8 CM2
ECHO LVOT DIAM: 2.2 CM
ECHO MV A VELOCITY: 0.3 M/S
ECHO MV E DECELERATION TIME (DT): 173.7 MS
ECHO MV E VELOCITY: 0.79 M/S
ECHO MV E/A RATIO: 2.63
ECHO MV E/E' LATERAL: 8.88
ECHO MV E/E' RATIO (AVERAGED): 9.7
ECHO MV E/E' SEPTAL: 10.53
ECHO PULMONARY ARTERY END DIASTOLIC PRESSURE: 7 MMHG
ECHO PULMONARY ARTERY SYSTOLIC PRESSURE (PASP): 53 MMHG
ECHO PV REGURGITANT MAX VELOCITY: 1.3 M/S
ECHO RA AREA 4C: 25.8 CM2
ECHO RIGHT VENTRICULAR SYSTOLIC PRESSURE (RVSP): 65 MMHG
ECHO RV BASAL DIMENSION: 5.3 CM
ECHO RV FREE WALL PEAK S': 15.4 CM/S
ECHO RV TAPSE: 2.9 CM (ref 1.7–?)
ECHO TV REGURGITANT MAX VELOCITY: 3.52 M/S
ECHO TV REGURGITANT PEAK GRADIENT: 52 MMHG
ENTEROBACTERALES DNA BLD POS NAA+N-PRB: DETECTED
EOSINOPHIL # BLD: 0 K/UL (ref 0–0.4)
EOSINOPHIL NFR BLD: 0 % (ref 0–7)
ERYTHROCYTE [DISTWIDTH] IN BLOOD BY AUTOMATED COUNT: 13 % (ref 11.5–14.5)
GLOBULIN SER CALC-MCNC: 4.1 G/DL (ref 2–4)
GLUCOSE SERPL-MCNC: 95 MG/DL (ref 65–100)
GP B STREP DNA BLD POS QL NAA+NON-PROBE: NOT DETECTED
HAEM INFLU DNA BLD POS QL NAA+NON-PROBE: NOT DETECTED
HCT VFR BLD AUTO: 38.9 % (ref 36.6–50.3)
HGB BLD-MCNC: 13 G/DL (ref 12.1–17)
IMM GRANULOCYTES # BLD AUTO: 0.4 K/UL (ref 0–0.04)
IMM GRANULOCYTES NFR BLD AUTO: 2 % (ref 0–0.5)
K OXYTOCA DNA BLD POS QL NAA+NON-PROBE: NOT DETECTED
KLEBSIELLA SP DNA BLD POS QL NAA+NON-PRB: DETECTED
KLEBSIELLA SP DNA BLD POS QL NAA+NON-PRB: NOT DETECTED
L MONOCYTOG DNA BLD POS QL NAA+NON-PROBE: NOT DETECTED
LYMPHOCYTES # BLD: 0.6 K/UL (ref 0.8–3.5)
LYMPHOCYTES NFR BLD: 3 % (ref 12–49)
MCH RBC QN AUTO: 31.9 PG (ref 26–34)
MCHC RBC AUTO-ENTMCNC: 33.4 G/DL (ref 30–36.5)
MCV RBC AUTO: 95.6 FL (ref 80–99)
MONOCYTES # BLD: 0.9 K/UL (ref 0–1)
MONOCYTES NFR BLD: 4 % (ref 5–13)
N MEN DNA BLD POS QL NAA+NON-PROBE: NOT DETECTED
NEUTS SEG # BLD: 19.5 K/UL (ref 1.8–8)
NEUTS SEG NFR BLD: 91 % (ref 32–75)
NRBC # BLD: 0 K/UL (ref 0–0.01)
NRBC BLD-RTO: 0 PER 100 WBC
P AERUGINOSA DNA BLD POS NAA+NON-PROBE: NOT DETECTED
PLATELET # BLD AUTO: 165 K/UL (ref 150–400)
PMV BLD AUTO: 12 FL (ref 8.9–12.9)
POTASSIUM SERPL-SCNC: 4.5 MMOL/L (ref 3.5–5.1)
PROT SERPL-MCNC: 6.5 G/DL (ref 6.4–8.2)
PROTEUS SP DNA BLD POS QL NAA+NON-PROBE: NOT DETECTED
RBC # BLD AUTO: 4.07 M/UL (ref 4.1–5.7)
RBC MORPH BLD: ABNORMAL
RESISTANT GENE NDM BY PCR: NOT DETECTED
RESISTANT GENE TARGETS: ABNORMAL
S AUREUS DNA BLD POS QL NAA+NON-PROBE: NOT DETECTED
S AUREUS+CONS DNA BLD POS NAA+NON-PROBE: NOT DETECTED
S EPIDERMIDIS DNA BLD POS QL NAA+NON-PRB: NOT DETECTED
S LUGDUNENSIS DNA BLD POS QL NAA+NON-PRB: NOT DETECTED
S MALTOPHILIA DNA BLD POS QL NAA+NON-PRB: NOT DETECTED
S MARCESCENS DNA BLD POS NAA+NON-PROBE: NOT DETECTED
S PNEUM DNA BLD POS QL NAA+NON-PROBE: NOT DETECTED
S PYO DNA BLD POS QL NAA+NON-PROBE: NOT DETECTED
SALMONELLA DNA BLD POS QL NAA+NON-PROBE: NOT DETECTED
SODIUM SERPL-SCNC: 139 MMOL/L (ref 136–145)
STREPTOCOCCUS DNA BLD POS NAA+NON-PROBE: NOT DETECTED
TROPONIN I SERPL HS-MCNC: 155 NG/L (ref 0–76)
WBC # BLD AUTO: 21.4 K/UL (ref 4.1–11.1)

## 2024-11-08 PROCEDURE — 2580000003 HC RX 258: Performed by: HOSPITALIST

## 2024-11-08 PROCEDURE — 87040 BLOOD CULTURE FOR BACTERIA: CPT

## 2024-11-08 PROCEDURE — 6370000000 HC RX 637 (ALT 250 FOR IP): Performed by: HOSPITALIST

## 2024-11-08 PROCEDURE — 36415 COLL VENOUS BLD VENIPUNCTURE: CPT

## 2024-11-08 PROCEDURE — 93306 TTE W/DOPPLER COMPLETE: CPT

## 2024-11-08 PROCEDURE — 2580000003 HC RX 258: Performed by: INTERNAL MEDICINE

## 2024-11-08 PROCEDURE — 93306 TTE W/DOPPLER COMPLETE: CPT | Performed by: INTERNAL MEDICINE

## 2024-11-08 PROCEDURE — 1100000003 HC PRIVATE W/ TELEMETRY

## 2024-11-08 PROCEDURE — 6360000002 HC RX W HCPCS: Performed by: INTERNAL MEDICINE

## 2024-11-08 PROCEDURE — 6370000000 HC RX 637 (ALT 250 FOR IP): Performed by: INTERNAL MEDICINE

## 2024-11-08 PROCEDURE — 6360000002 HC RX W HCPCS: Performed by: HOSPITALIST

## 2024-11-08 PROCEDURE — 80053 COMPREHEN METABOLIC PANEL: CPT

## 2024-11-08 PROCEDURE — 85025 COMPLETE CBC W/AUTO DIFF WBC: CPT

## 2024-11-08 PROCEDURE — 84484 ASSAY OF TROPONIN QUANT: CPT

## 2024-11-08 RX ORDER — TRIAMCINOLONE ACETONIDE 1 MG/G
1 CREAM TOPICAL 2 TIMES DAILY
COMMUNITY

## 2024-11-08 RX ORDER — LISINOPRIL 40 MG/1
40 TABLET ORAL DAILY
Status: DISCONTINUED | OUTPATIENT
Start: 2024-11-08 | End: 2024-11-13 | Stop reason: HOSPADM

## 2024-11-08 RX ADMIN — PANTOPRAZOLE SODIUM 40 MG: 40 TABLET, DELAYED RELEASE ORAL at 11:14

## 2024-11-08 RX ADMIN — SODIUM CHLORIDE, PRESERVATIVE FREE 10 ML: 5 INJECTION INTRAVENOUS at 10:40

## 2024-11-08 RX ADMIN — ASPIRIN 81 MG: 81 TABLET, COATED ORAL at 11:13

## 2024-11-08 RX ADMIN — WATER 2000 MG: 1 INJECTION INTRAMUSCULAR; INTRAVENOUS; SUBCUTANEOUS at 11:54

## 2024-11-08 RX ADMIN — ATORVASTATIN CALCIUM 80 MG: 40 TABLET, FILM COATED ORAL at 11:13

## 2024-11-08 RX ADMIN — ENOXAPARIN SODIUM 40 MG: 100 INJECTION SUBCUTANEOUS at 11:25

## 2024-11-08 RX ADMIN — SODIUM CHLORIDE, PRESERVATIVE FREE 10 ML: 5 INJECTION INTRAVENOUS at 22:05

## 2024-11-08 RX ADMIN — TAMSULOSIN HYDROCHLORIDE 0.4 MG: 0.4 CAPSULE ORAL at 11:15

## 2024-11-08 RX ADMIN — SERTRALINE 150 MG: 100 TABLET, FILM COATED ORAL at 11:54

## 2024-11-08 RX ADMIN — ATENOLOL 12.5 MG: 25 TABLET ORAL at 11:12

## 2024-11-08 RX ADMIN — LISINOPRIL 40 MG: 40 TABLET ORAL at 11:13

## 2024-11-08 RX ADMIN — WATER 2000 MG: 1 INJECTION INTRAMUSCULAR; INTRAVENOUS; SUBCUTANEOUS at 01:50

## 2024-11-08 RX ADMIN — ACETAMINOPHEN 650 MG: 325 TABLET ORAL at 22:04

## 2024-11-08 ASSESSMENT — PAIN SCALES - GENERAL
PAINLEVEL_OUTOF10: 0
PAINLEVEL_OUTOF10: 0
PAINLEVEL_OUTOF10: 3

## 2024-11-08 ASSESSMENT — PAIN DESCRIPTION - LOCATION: LOCATION: HEAD

## 2024-11-08 ASSESSMENT — PAIN DESCRIPTION - DESCRIPTORS: DESCRIPTORS: ACHING

## 2024-11-08 NOTE — CARE COORDINATION
Care Management Initial Assessment       RUR: 13% LOW   Readmission? No  1st IM letter given? Yes - 11/8 by Case Management   1st  letter given: No     11/08/24 3948   Service Assessment   Patient Orientation Alert and Oriented   Cognition Alert   History Provided By Patient   Primary Caregiver Self   Accompanied By/Relationship Wife Emma   Support Systems Spouse/Significant Other;Other (Comment)  ([place])   Patient's Healthcare Decision Maker is: Legal Next of Kin   PCP Verified by CM Yes  (César Ray)   Last Visit to PCP Within last 3 months   Prior Functional Level Assistance with the following:;Bathing;Dressing;Housework;Cooking;Shopping   Current Functional Level Assistance with the following:;Mobility;Bathing;Dressing;Toileting;Cooking;Shopping;Housework   Can patient return to prior living arrangement Unknown at present   Ability to make needs known: Good   Family able to assist with home care needs: No   Would you like for me to discuss the discharge plan with any other family members/significant others, and if so, who? Yes  ([facility], wife)   Financial Resources Medicare   Social/Functional History   Lives With Spouse   Type of Home House   Home Equipment Cane   Active  No   Patient's  Info [facility]   Discharge Planning   Type of Residence Assisted living  ([facility])   Living Arrangements Other (Comment)  ([facility] [place])   Patient expects to be discharged to: Assisted living     Mr. Garcia lives at [facility] [place]. His wife Emma lives in independent living. She said he has lived there for a couple weeks and isn't sure if he'll return to [place] or not. Too early to tell what dc  plans are. He is in inpt status. If SNF required then qualifying midnights include 11/7, 11/8 and 11/9. Eligible on 11/10 which is Sunday. [facility] will not accept on weekend. Important Message from Medicare Letter provided to Olu Garcia Oral explanation was provided and all questions answered. Signed document placed on the bedside chart to be

## 2024-11-08 NOTE — PROGRESS NOTES
Pharmacy Clarification of Prior to Admission Medication Regimen     The patient was interviewed regarding clarification of the prior to admission medication regimen. Patient present in room and obtained permission from patient to discuss drug regimen with visitor(s) present.     Information Obtained From: Patient    Allergies updated/ Reaction: NKDA    Organizer (pill box, bottles, etc): Pill Organizer    Additions: Triamcinolone cream    Medications that need DELETION: Multi vitamin    Changes: None    Pertinent Pharmacy Findings:  Updated patient’s preferred outpatient pharmacy to: [facility] in [place], [place]      Patient was questioned regarding use of any other inhalers, topical products, over the counter medications, herbal medications, vitamin products or ophthalmic/nasal/otic medication use.        Patient was inquired about side effects and was asked about pharmacist consultation if needed or desired (Y/N): Y      PTA medication list was corrected to the following:     Prior to Admission Medications   Prescriptions Last Dose Informant Patient Reported? Taking?   Cholecalciferol (VITAMIN D-3 PO) 11/7/2024  Yes Yes   Sig: Take 1 tablet by mouth daily   Cyanocobalamin 500 MCG SUBL 11/7/2024  Yes Yes   Sig: Place 1 tablet under the tongue daily   aspirin 81 MG EC tablet 11/7/2024  Yes Yes   Sig: Take 1 tablet by mouth daily   atenolol (TENORMIN) 25 MG tablet 11/7/2024  Yes Yes   Sig: Take 0.5 tablets by mouth daily   atorvastatin (LIPITOR) 80 MG tablet 11/7/2024  Yes Yes   Sig: Take 1 tablet by mouth daily   cholestyramine light 4 g packet 11/7/2024  Yes Yes   Sig: Take 1 packet by mouth daily   lansoprazole (PREVACID) 30 MG delayed release capsule 11/7/2024  Yes Yes   Sig: Take 1 capsule by mouth daily   lisinopril (PRINIVIL;ZESTRIL) 40 MG tablet 11/7/2024  Yes Yes   Sig: Take 1 tablet by mouth daily   sertraline (ZOLOFT) 100 MG tablet 11/7/2024  Yes Yes   Sig: Take 1.5 tablets by mouth daily    tamsulosin (FLOMAX) 0.4 MG capsule 11/7/2024  Yes Yes   Sig: Take 1 capsule by mouth daily   triamcinolone (KENALOG) 0.1 % cream 11/7/2024  Yes Yes   Sig: Apply 1 g topically 2 times daily Apply topically 2 times daily.      Facility-Administered Medications: None        Thank you,  Camila Mcgarry [facility]

## 2024-11-08 NOTE — H&P
Hospitalist Admission Note    NAME:   Olu Garcia   : 1945   MRN: [id number]     Date/Time: 2024 9:04 PM    Patient PCP: César Ray MD    ______________________________________________________________________  Given the patient's current clinical presentation, I have a high level of concern for decompensation if discharged from the emergency department.  Complex decision making was performed, which includes reviewing the patient's available past medical records, laboratory results, and x-ray films.       My assessment of this patient's clinical condition and my plan of care is as follows.    Assessment / Plan:    78 y.o.  male with PMHx significant for coronary artery disease status post stents, hypertension, depression hyperlipidemia who presents to the emergency department with fever and confusion.  Workup in the emergency department was significant for SIRS criteria of leukocytosis and tachypnea with source of infection being urinary tract infection.  Patient met severe sepsis criteria given initial lactic acid of 2.3.  Of note, troponin was also mildly elevated and rising likely secondary to a type II event from severe sepsis.  She will be placed on the hospital service and treated with IV cefepime and blood cultures and urine cultures will be followed.    Severe sepsis secondary to urinary tract infection:  -IV cefepime  -Blood cultures obtained in the emergency department, will follow  -Urine cultures obtained in emergency department, will follow  -Telemetry  -Hold off IV fluids with concerns of interstitial edema on chest x-ray    2.  Elevated troponin: Initial troponin of 130 with repeat of 160.  Patient a poor historian but denies any chest pain.  Likely type II event secondary to severe sepsis.   -Trend troponins   -Continue home medications   -Telemetry   -Echo in the morning    3.  Coronary artery disease status post stents: Patient denies chest pain but does have an elevated

## 2024-11-08 NOTE — ED NOTES
I spoke with Dr. Arthur and he was fine not giving this patient his night BP meds because of his current BP and HR readings. He stated that we can give them to the patient at 0900 this morning. This patient is also unable to swallow his Flomax and Zoloft and these medications can not be crushed

## 2024-11-08 NOTE — PROGRESS NOTES
4.5    103   CO2 25 31   BUN 19 24*   ALT 21 30       RADIOLOGY:  pCXR 11/7:  Heart size is stable. The thoracic aorta remains tortuous. There is a  probable hiatal hernia. Mild pulmonary interstitial edema pattern is present   There is bibasilar atelectasis. No pneumothorax is evident. The visualized bones  and upper abdomen are age-appropriate.   IMPRESSION:  Mild pulmonary interstitial edema pattern with bibasilar atelectasis    EKG 11/7:  NSR 74 bpm, Inf MI, Prolonged QT, Inf MI, ABN, Since prev [id number] noted Inf MI    Procedures: see electronic medical records for all procedures/Xrays and details which were not copied into this note but were reviewed prior to creation of Plan.        Assessment / Plan:    Principal Problem:    Sepsis (HCC)  Active Problems:    UTI  Cont tx Flomax 0.4mg daily  Empiric tx Cefepime pending C&S      ASCVD (arteriosclerotic cardiovascular disease)    Elevated troponin  Type 2 with LV strain a/w infection / sepsis  Maintain meds as PTA  ASA 81mg, Atenolol 12.5mg, Lipitor 80mg, Lisinopril 40mg      Essential hypertension  Atenolol 12.5mg, Lisinopril 40mg      Hypercholesterolemia  Lipitor 80mg daily      GERD (gastroesophageal reflux disease)  Protonix 40mg daily        ______________________________________________________________________  SAFETY:   Code Status:Full  DVT prophylaxis:  Lovenox  Stress Ulcer prophylaxis: Protonix  Bladder catheter: no  Family Contact Info:  Primary Emergency Contact: Emma Garcia, Home Phone: [phone]  Bedded: [facility] Room 130/01  Disposition: TBD, likely home when stable  Admission status:  Inpatient    Reviewed most current lab test results and cultures  YES  Reviewed most current radiology test results   YES  Review and summation of old records today    NO  Reviewed patient's current orders and MAR    YES  PMH/SH reviewed - no change compared to H&P    Care Plan discussed with:                                   Comments  Patient x

## 2024-11-08 NOTE — ED NOTES
Admission SBAR Note  Situation/Background: Patient presented to ED via EMS for fever and lethargy. Patient is alert and oriented with some moments of confusion. Patient follows commands.     Patient is being transferred to M/S ([facility]), Room# 130    Patient's Chief Complaint was fever and is admitted for sepsis, UTI without hematuria, ACS.    CODE STATUS: Full  CSSRS: 0 - No Risk    ISOLATION/PRECAUTIONS: No  ISOLATION TYPE: n/a    Is this a behavioral health patient? No  Has wanding been completed No  Are belongings secure? No    Called outstanding consults: Yes    STAT labs collected: Yes    Repeat Lactic Acid DUE? No  TIME DUE: n/a    All STAT orders are complete: Yes    The following personal items will be sent with the patient during transfer to the floor:     All valuables: listed in ER with patient      ASSESSMENT:    NEURO:   NIH SCORE: 0,1-4,5-15,15-20,21-42: 0   CÉSAR SWALLOW SCREEN COMPLETE: Yes  ORIENTATION LEVEL: ORIENTATION LEVEL: Person  Cognition: following commands  follows multi-step complex commands/direction  Speech: shows no evidence of impairment    Is patient impulsive? No  Is patient oriented? Yes  Do they follow commands? Yes  Is the patient ambulatory? Yes    FALL RISK? Yes  Interventions: Implemented/recommended use of non-skid footwear, Implemented/recommended resources for alarm system (personal alarm, bed alarm, call bell, etc.) , and Implemented/recommended increased supervision/assistance    RESPIRATORY:   Is patient on oxygen? No  Oxygen therapy: n/a  O2 rate: n/a    CARDIAC:   Is cardiac monitoring ordered? Yes    Last Rhythm: Rhythm including paccardio: Normal Sinus Rhythm   Patient to transfer with tele box on? Yes  Infusions: Meds; iv fluids: none  LINE ACCESS: 18G Peripheral IV , Antecubital , Iv rate: heplock       /GI:   Continent Bowel/Bladder? No  Urinary Output: 400+  Chronic or Acute: n/a  If Chronic, is

## 2024-11-09 PROBLEM — B96.1 BACTEREMIA DUE TO KLEBSIELLA PNEUMONIAE: Status: ACTIVE | Noted: 2024-11-09

## 2024-11-09 PROBLEM — R78.81 BACTEREMIA DUE TO KLEBSIELLA PNEUMONIAE: Status: ACTIVE | Noted: 2024-11-09

## 2024-11-09 PROBLEM — N30.00 ACUTE CYSTITIS WITHOUT HEMATURIA: Status: ACTIVE | Noted: 2024-11-09

## 2024-11-09 LAB
ANION GAP SERPL CALC-SCNC: 7 MMOL/L (ref 2–12)
BACTERIA SPEC CULT: ABNORMAL
BASOPHILS # BLD: 0 K/UL (ref 0–0.1)
BASOPHILS NFR BLD: 0 % (ref 0–1)
BUN SERPL-MCNC: 23 MG/DL (ref 6–20)
BUN/CREAT SERPL: 21 (ref 12–20)
CALCIUM SERPL-MCNC: 8.9 MG/DL (ref 8.5–10.1)
CC UR VC: ABNORMAL
CHLORIDE SERPL-SCNC: 103 MMOL/L (ref 97–108)
CO2 SERPL-SCNC: 28 MMOL/L (ref 21–32)
CREAT SERPL-MCNC: 1.08 MG/DL (ref 0.7–1.3)
DIFFERENTIAL METHOD BLD: ABNORMAL
EKG ATRIAL RATE: 75 BPM
EKG DIAGNOSIS: NORMAL
EKG P AXIS: 74 DEGREES
EKG P-R INTERVAL: 166 MS
EKG Q-T INTERVAL: 464 MS
EKG QRS DURATION: 84 MS
EKG QTC CALCULATION (BAZETT): 518 MS
EKG R AXIS: -7 DEGREES
EKG T AXIS: 14 DEGREES
EKG VENTRICULAR RATE: 75 BPM
EOSINOPHIL # BLD: 0.1 K/UL (ref 0–0.4)
EOSINOPHIL NFR BLD: 1 % (ref 0–7)
ERYTHROCYTE [DISTWIDTH] IN BLOOD BY AUTOMATED COUNT: 12.7 % (ref 11.5–14.5)
GLUCOSE SERPL-MCNC: 119 MG/DL (ref 65–100)
HCT VFR BLD AUTO: 37.1 % (ref 36.6–50.3)
HGB BLD-MCNC: 12.4 G/DL (ref 12.1–17)
IMM GRANULOCYTES # BLD AUTO: 0 K/UL (ref 0–0.04)
IMM GRANULOCYTES NFR BLD AUTO: 0 % (ref 0–0.5)
LYMPHOCYTES # BLD: 0.2 K/UL (ref 0.8–3.5)
LYMPHOCYTES NFR BLD: 2 % (ref 12–49)
MCH RBC QN AUTO: 31.5 PG (ref 26–34)
MCHC RBC AUTO-ENTMCNC: 33.4 G/DL (ref 30–36.5)
MCV RBC AUTO: 94.2 FL (ref 80–99)
MONOCYTES # BLD: 0.6 K/UL (ref 0–1)
MONOCYTES NFR BLD: 5 % (ref 5–13)
NEUTS BAND NFR BLD MANUAL: 4 %
NEUTS SEG # BLD: 11.3 K/UL (ref 1.8–8)
NEUTS SEG NFR BLD: 88 % (ref 32–75)
NRBC # BLD: 0 K/UL (ref 0–0.01)
NRBC BLD-RTO: 0 PER 100 WBC
PLATELET # BLD AUTO: 154 K/UL (ref 150–400)
PLATELET COMMENT: ABNORMAL
PMV BLD AUTO: 12.6 FL (ref 8.9–12.9)
POTASSIUM SERPL-SCNC: 4.1 MMOL/L (ref 3.5–5.1)
RBC # BLD AUTO: 3.94 M/UL (ref 4.1–5.7)
RBC MORPH BLD: ABNORMAL
SERVICE CMNT-IMP: ABNORMAL
SODIUM SERPL-SCNC: 138 MMOL/L (ref 136–145)
WBC # BLD AUTO: 12.2 K/UL (ref 4.1–11.1)

## 2024-11-09 PROCEDURE — 36415 COLL VENOUS BLD VENIPUNCTURE: CPT

## 2024-11-09 PROCEDURE — 80048 BASIC METABOLIC PNL TOTAL CA: CPT

## 2024-11-09 PROCEDURE — 94760 N-INVAS EAR/PLS OXIMETRY 1: CPT

## 2024-11-09 PROCEDURE — 85025 COMPLETE CBC W/AUTO DIFF WBC: CPT

## 2024-11-09 PROCEDURE — 6360000002 HC RX W HCPCS: Performed by: HOSPITALIST

## 2024-11-09 PROCEDURE — 2580000003 HC RX 258: Performed by: HOSPITALIST

## 2024-11-09 PROCEDURE — 2580000003 HC RX 258: Performed by: INTERNAL MEDICINE

## 2024-11-09 PROCEDURE — 6370000000 HC RX 637 (ALT 250 FOR IP): Performed by: HOSPITALIST

## 2024-11-09 PROCEDURE — 6370000000 HC RX 637 (ALT 250 FOR IP): Performed by: INTERNAL MEDICINE

## 2024-11-09 PROCEDURE — 6360000002 HC RX W HCPCS: Performed by: INTERNAL MEDICINE

## 2024-11-09 PROCEDURE — 1100000003 HC PRIVATE W/ TELEMETRY

## 2024-11-09 RX ADMIN — SODIUM CHLORIDE, PRESERVATIVE FREE 5 ML: 5 INJECTION INTRAVENOUS at 23:02

## 2024-11-09 RX ADMIN — ATENOLOL 12.5 MG: 25 TABLET ORAL at 10:11

## 2024-11-09 RX ADMIN — WATER 2000 MG: 1 INJECTION INTRAMUSCULAR; INTRAVENOUS; SUBCUTANEOUS at 14:51

## 2024-11-09 RX ADMIN — ATORVASTATIN CALCIUM 80 MG: 40 TABLET, FILM COATED ORAL at 10:10

## 2024-11-09 RX ADMIN — SODIUM CHLORIDE, PRESERVATIVE FREE 10 ML: 5 INJECTION INTRAVENOUS at 10:12

## 2024-11-09 RX ADMIN — SERTRALINE 150 MG: 100 TABLET, FILM COATED ORAL at 10:11

## 2024-11-09 RX ADMIN — PANTOPRAZOLE SODIUM 40 MG: 40 TABLET, DELAYED RELEASE ORAL at 06:28

## 2024-11-09 RX ADMIN — LISINOPRIL 40 MG: 40 TABLET ORAL at 10:11

## 2024-11-09 RX ADMIN — ASPIRIN 81 MG: 81 TABLET, COATED ORAL at 10:11

## 2024-11-09 RX ADMIN — CEFTRIAXONE SODIUM 2000 MG: 2 INJECTION, POWDER, FOR SOLUTION INTRAMUSCULAR; INTRAVENOUS at 23:04

## 2024-11-09 RX ADMIN — ENOXAPARIN SODIUM 40 MG: 100 INJECTION SUBCUTANEOUS at 10:12

## 2024-11-09 RX ADMIN — TAMSULOSIN HYDROCHLORIDE 0.4 MG: 0.4 CAPSULE ORAL at 10:11

## 2024-11-09 RX ADMIN — WATER 2000 MG: 1 INJECTION INTRAMUSCULAR; INTRAVENOUS; SUBCUTANEOUS at 00:11

## 2024-11-09 NOTE — PROGRESS NOTES
[facility]  Hospitalist Progress Note    NAME: Olu Garcia   :  1945   MRN:  [id number]     Total duration of encounter: 2 days      Interim Hospital Summary: 78 y.o. male who presented on 2024 with Sepsis (HCC). He has a past medical history of CAD (coronary artery disease), Depression, GERD (gastroesophageal reflux disease), and Hypertension..       Pt presented to ED with c/o fever and confusion.  ED w/u noted for SIRS and UTI.  Med Criteria for severe Sepsis with elevation in Lactate.   Rx Cefepime with BC and UC pending.     Subjective:     Chief Complaint / Reason for Physician Visit  \"OK\".  Discussed with EVENS Santiago today  Eating  Lives in own home with his wife    Review of Systems:  Symptom Y/N Comments  Symptom Y/N Comments   Fever/Chills n   Chest Pain n    Poor Appetite n   Edema n    Cough n   Abdominal Pain y    Sputum n   Joint Pain n    SOB/TORRES n   Pruritis/Rash n    Nausea/vomit n   Tolerating PT/OT     Diarrhea n   Tolerating Diet y    Constipation n   Other         Current Facility-Administered Medications:     lisinopril (PRINIVIL;ZESTRIL) tablet 40 mg, 40 mg, Oral, Daily, Jose Arthur MD, 40 mg at 24 1011    ceFEPIme (MAXIPIME) 2,000 mg in sterile water 20 mL IV syringe, 2,000 mg, IntraVENous, Q12H, Freida Tran MD, 2,000 mg at 24 1451    aspirin EC tablet 81 mg, 81 mg, Oral, Daily, Freida Tran MD, 81 mg at 24 1011    atenolol (TENORMIN) tablet 12.5 mg, 12.5 mg, Oral, Daily, Jose Arthur MD, 12.5 mg at 24 1011    atorvastatin (LIPITOR) tablet 80 mg, 80 mg, Oral, Daily, Freida Tran MD, 80 mg at 24 1010    pantoprazole (PROTONIX) tablet 40 mg, 40 mg, Oral, QAM AC, Freida Tran MD, 40 mg at 24 0628    sertraline (ZOLOFT) tablet 150 mg, 150 mg, Oral, Daily, Freida Tran MD, 150 mg at 24 1011    tamsulosin (FLOMAX) capsule 0.4 mg, 0.4 mg, Oral, Daily, Freida Tran,

## 2024-11-10 LAB
ANION GAP SERPL CALC-SCNC: 7 MMOL/L (ref 2–12)
BACTERIA SPEC CULT: ABNORMAL
BASOPHILS # BLD: 0 K/UL (ref 0–0.1)
BASOPHILS NFR BLD: 0 % (ref 0–1)
BUN SERPL-MCNC: 16 MG/DL (ref 6–20)
BUN/CREAT SERPL: 19 (ref 12–20)
CALCIUM SERPL-MCNC: 8.4 MG/DL (ref 8.5–10.1)
CHLORIDE SERPL-SCNC: 105 MMOL/L (ref 97–108)
CO2 SERPL-SCNC: 30 MMOL/L (ref 21–32)
CREAT SERPL-MCNC: 0.85 MG/DL (ref 0.7–1.3)
DIFFERENTIAL METHOD BLD: ABNORMAL
EOSINOPHIL # BLD: 0.1 K/UL (ref 0–0.4)
EOSINOPHIL NFR BLD: 1 % (ref 0–7)
ERYTHROCYTE [DISTWIDTH] IN BLOOD BY AUTOMATED COUNT: 12.6 % (ref 11.5–14.5)
GLUCOSE SERPL-MCNC: 93 MG/DL (ref 65–100)
HCT VFR BLD AUTO: 37.4 % (ref 36.6–50.3)
HGB BLD-MCNC: 12.4 G/DL (ref 12.1–17)
IMM GRANULOCYTES # BLD AUTO: 0 K/UL (ref 0–0.04)
IMM GRANULOCYTES NFR BLD AUTO: 0 % (ref 0–0.5)
LYMPHOCYTES # BLD: 0.6 K/UL (ref 0.8–3.5)
LYMPHOCYTES NFR BLD: 8 % (ref 12–49)
MCH RBC QN AUTO: 31.6 PG (ref 26–34)
MCHC RBC AUTO-ENTMCNC: 33.2 G/DL (ref 30–36.5)
MCV RBC AUTO: 95.2 FL (ref 80–99)
MONOCYTES # BLD: 0.1 K/UL (ref 0–1)
MONOCYTES NFR BLD: 1 % (ref 5–13)
NEUTS BAND NFR BLD MANUAL: 3 %
NEUTS SEG # BLD: 7.1 K/UL (ref 1.8–8)
NEUTS SEG NFR BLD: 87 % (ref 32–75)
NRBC # BLD: 0 K/UL (ref 0–0.01)
NRBC BLD-RTO: 0 PER 100 WBC
PLATELET # BLD AUTO: 152 K/UL (ref 150–400)
PLATELET COMMENT: ABNORMAL
PMV BLD AUTO: 12.5 FL (ref 8.9–12.9)
POTASSIUM SERPL-SCNC: 4.1 MMOL/L (ref 3.5–5.1)
RBC # BLD AUTO: 3.93 M/UL (ref 4.1–5.7)
RBC MORPH BLD: ABNORMAL
SERVICE CMNT-IMP: ABNORMAL
SERVICE CMNT-IMP: ABNORMAL
SODIUM SERPL-SCNC: 142 MMOL/L (ref 136–145)
WBC # BLD AUTO: 7.9 K/UL (ref 4.1–11.1)

## 2024-11-10 PROCEDURE — 6370000000 HC RX 637 (ALT 250 FOR IP): Performed by: INTERNAL MEDICINE

## 2024-11-10 PROCEDURE — 36415 COLL VENOUS BLD VENIPUNCTURE: CPT

## 2024-11-10 PROCEDURE — 1100000000 HC RM PRIVATE

## 2024-11-10 PROCEDURE — 6360000002 HC RX W HCPCS: Performed by: HOSPITALIST

## 2024-11-10 PROCEDURE — 85025 COMPLETE CBC W/AUTO DIFF WBC: CPT

## 2024-11-10 PROCEDURE — 6370000000 HC RX 637 (ALT 250 FOR IP): Performed by: HOSPITALIST

## 2024-11-10 PROCEDURE — 6360000002 HC RX W HCPCS: Performed by: INTERNAL MEDICINE

## 2024-11-10 PROCEDURE — 80048 BASIC METABOLIC PNL TOTAL CA: CPT

## 2024-11-10 PROCEDURE — 2580000003 HC RX 258: Performed by: INTERNAL MEDICINE

## 2024-11-10 PROCEDURE — 94760 N-INVAS EAR/PLS OXIMETRY 1: CPT

## 2024-11-10 PROCEDURE — 2580000003 HC RX 258: Performed by: HOSPITALIST

## 2024-11-10 RX ADMIN — PANTOPRAZOLE SODIUM 40 MG: 40 TABLET, DELAYED RELEASE ORAL at 07:11

## 2024-11-10 RX ADMIN — SODIUM CHLORIDE, PRESERVATIVE FREE 5 ML: 5 INJECTION INTRAVENOUS at 20:52

## 2024-11-10 RX ADMIN — SODIUM CHLORIDE, PRESERVATIVE FREE 10 ML: 5 INJECTION INTRAVENOUS at 11:30

## 2024-11-10 RX ADMIN — ATENOLOL 12.5 MG: 25 TABLET ORAL at 11:28

## 2024-11-10 RX ADMIN — ENOXAPARIN SODIUM 40 MG: 100 INJECTION SUBCUTANEOUS at 11:28

## 2024-11-10 RX ADMIN — LISINOPRIL 40 MG: 40 TABLET ORAL at 11:28

## 2024-11-10 RX ADMIN — TAMSULOSIN HYDROCHLORIDE 0.4 MG: 0.4 CAPSULE ORAL at 11:29

## 2024-11-10 RX ADMIN — ACETAMINOPHEN 650 MG: 325 TABLET ORAL at 20:51

## 2024-11-10 RX ADMIN — ASPIRIN 81 MG: 81 TABLET, COATED ORAL at 11:27

## 2024-11-10 RX ADMIN — CEFTRIAXONE SODIUM 2000 MG: 2 INJECTION, POWDER, FOR SOLUTION INTRAMUSCULAR; INTRAVENOUS at 23:32

## 2024-11-10 RX ADMIN — SERTRALINE 150 MG: 100 TABLET, FILM COATED ORAL at 11:27

## 2024-11-10 RX ADMIN — SODIUM CHLORIDE 20 ML/HR: 9 INJECTION, SOLUTION INTRAVENOUS at 20:54

## 2024-11-10 RX ADMIN — ATORVASTATIN CALCIUM 80 MG: 40 TABLET, FILM COATED ORAL at 11:27

## 2024-11-10 ASSESSMENT — PAIN DESCRIPTION - LOCATION: LOCATION: LEG

## 2024-11-10 ASSESSMENT — PAIN DESCRIPTION - ORIENTATION: ORIENTATION: RIGHT

## 2024-11-10 ASSESSMENT — PAIN SCALES - GENERAL: PAINLEVEL_OUTOF10: 3

## 2024-11-10 NOTE — PROGRESS NOTES
[facility]  Hospitalist Progress Note    NAME: Olu Garcia   :  1945   MRN:  [id number]     Total duration of encounter: 3 days      Interim Hospital Summary: 78 y.o. male who presented on 2024 with Sepsis (HCC). He has a past medical history of CAD (coronary artery disease), Depression, GERD (gastroesophageal reflux disease), and Hypertension..       Pt presented to ED with c/o fever and confusion.  ED w/u noted for SIRS and UTI.  Met Criteria for severe Sepsis with elevation in Lactate.   Rx Cefepime with BC and UC pending.  Lives in [place] at [facility],  has poor memory.     Subjective:     Chief Complaint / Reason for Physician Visit  \"OK\".  Discussed with EVENS Santiago today  Eating  Did not feel wife visited today  (Staff notes she has been here twice)    Review of Systems:  Symptom Y/N Comments  Symptom Y/N Comments   Fever/Chills n   Chest Pain n    Poor Appetite n   Edema n    Cough n   Abdominal Pain y    Sputum n   Joint Pain n    SOB/TORRES n   Pruritis/Rash n    Nausea/vomit n   Tolerating PT/OT     Diarrhea n   Tolerating Diet y    Constipation n   Other         Current Facility-Administered Medications:     cefTRIAXone (ROCEPHIN) 2,000 mg in sodium chloride 0.9 % 50 mL IVPB (mini-bag), 2,000 mg, IntraVENous, Q24H, Ruel Ramos MD, Stopped at 24 2334    lisinopril (PRINIVIL;ZESTRIL) tablet 40 mg, 40 mg, Oral, Daily, Jose Arthur MD, 40 mg at 11/10/24 1128    aspirin EC tablet 81 mg, 81 mg, Oral, Daily, Freida Tran MD, 81 mg at 11/10/24 1127    atenolol (TENORMIN) tablet 12.5 mg, 12.5 mg, Oral, Daily, Jose Arthur MD, 12.5 mg at 11/10/24 1128    atorvastatin (LIPITOR) tablet 80 mg, 80 mg, Oral, Daily, Freida Tran MD, 80 mg at 11/10/24 1127    pantoprazole (PROTONIX) tablet 40 mg, 40 mg, Oral, [facility], Freida Tran MD, 40 mg at 11/10/24 0711    sertraline (ZOLOFT) tablet 150 mg, 150 mg, Oral, Daily, Freida Tran MD, 150 mg at  12.2* 7.9   HGB 13.0 12.4 12.4   HCT 38.9 37.1 37.4    154 152     Recent Labs     11/08/24  0704 11/09/24  1250 11/10/24  0633    138 142   K 4.5 4.1 4.1    103 105   CO2 31 28 30   BUN 24* 23* 16   ALT 30  --   --      CULTURES  Paired BC 11/7:   @ of 2 + Klebsiella  Blood PCR 11/7:  positive or Klebsiella  UC 11/7: >100,000 col/cc Klebsiella with Sens Rocephin  BC 11/8:   no growth to date    RADIOLOGY:  pCXR 11/7:  Heart size is stable. The thoracic aorta remains tortuous. There is a  probable hiatal hernia. Mild pulmonary interstitial edema pattern is present   There is bibasilar atelectasis. No pneumothorax is evident. The visualized bones  and upper abdomen are age-appropriate.   IMPRESSION:  Mild pulmonary interstitial edema pattern with bibasilar atelectasis    EKG 11/7:  NSR 74 bpm, Inf MI, Prolonged QT, Inf MI, ABN, Since prev [id number] noted Inf MI    Procedures: see electronic medical records for all procedures/Xrays and details which were not copied into this note but were reviewed prior to creation of Plan.        Assessment / Plan:    Principal Problem:    Sepsis (HCC)  Active Problems:    UTI - Klebsiella    Bacteremia - Klebsiella  Cont tx Flomax 0.4mg daily  Empiric tx Cefepime  --> change to Rocephin 2g daily this evening (plan 5d IV total)  Urine and Blood Positive same Suscep pattern  F/u BC neg to date  Will d/c on oral for additional 5d - Tuesday earliest  Possible d/c to [facility]      ASCVD (arteriosclerotic cardiovascular disease)    Elevated troponin  Type 2 with LV strain a/w infection / sepsis  Maintain meds as PTA  ASA 81mg, Atenolol 12.5mg, Lipitor 80mg, Lisinopril 40mg      Essential hypertension  Atenolol 12.5mg, Lisinopril 40mg      Hypercholesterolemia  Lipitor 80mg daily      GERD (gastroesophageal reflux disease)  Protonix 40mg daily    ______________________________________________________________________  SAFETY:   Code Status:Full  DVT prophylaxis:

## 2024-11-11 LAB
ANION GAP SERPL CALC-SCNC: 7 MMOL/L (ref 2–12)
BASOPHILS # BLD: 0 K/UL (ref 0–0.1)
BASOPHILS NFR BLD: 0 % (ref 0–1)
BUN SERPL-MCNC: 14 MG/DL (ref 6–20)
BUN/CREAT SERPL: 15 (ref 12–20)
CALCIUM SERPL-MCNC: 8.6 MG/DL (ref 8.5–10.1)
CHLORIDE SERPL-SCNC: 105 MMOL/L (ref 97–108)
CO2 SERPL-SCNC: 29 MMOL/L (ref 21–32)
CREAT SERPL-MCNC: 0.96 MG/DL (ref 0.7–1.3)
DIFFERENTIAL METHOD BLD: ABNORMAL
EOSINOPHIL # BLD: 0.1 K/UL (ref 0–0.4)
EOSINOPHIL NFR BLD: 1 % (ref 0–7)
ERYTHROCYTE [DISTWIDTH] IN BLOOD BY AUTOMATED COUNT: 12.6 % (ref 11.5–14.5)
GLUCOSE SERPL-MCNC: 88 MG/DL (ref 65–100)
HCT VFR BLD AUTO: 37.4 % (ref 36.6–50.3)
HGB BLD-MCNC: 12.4 G/DL (ref 12.1–17)
IMM GRANULOCYTES # BLD AUTO: 0 K/UL (ref 0–0.04)
IMM GRANULOCYTES NFR BLD AUTO: 1 % (ref 0–0.5)
LYMPHOCYTES # BLD: 0.9 K/UL (ref 0.8–3.5)
LYMPHOCYTES NFR BLD: 11 % (ref 12–49)
MCH RBC QN AUTO: 31.5 PG (ref 26–34)
MCHC RBC AUTO-ENTMCNC: 33.2 G/DL (ref 30–36.5)
MCV RBC AUTO: 94.9 FL (ref 80–99)
MONOCYTES # BLD: 0.6 K/UL (ref 0–1)
MONOCYTES NFR BLD: 7 % (ref 5–13)
NEUTS SEG # BLD: 6.3 K/UL (ref 1.8–8)
NEUTS SEG NFR BLD: 80 % (ref 32–75)
NRBC # BLD: 0 K/UL (ref 0–0.01)
NRBC BLD-RTO: 0 PER 100 WBC
PLATELET # BLD AUTO: 156 K/UL (ref 150–400)
PMV BLD AUTO: 11.7 FL (ref 8.9–12.9)
POTASSIUM SERPL-SCNC: 3.9 MMOL/L (ref 3.5–5.1)
RBC # BLD AUTO: 3.94 M/UL (ref 4.1–5.7)
SODIUM SERPL-SCNC: 141 MMOL/L (ref 136–145)
WBC # BLD AUTO: 7.8 K/UL (ref 4.1–11.1)

## 2024-11-11 PROCEDURE — 2580000003 HC RX 258: Performed by: HOSPITALIST

## 2024-11-11 PROCEDURE — 94760 N-INVAS EAR/PLS OXIMETRY 1: CPT

## 2024-11-11 PROCEDURE — 6370000000 HC RX 637 (ALT 250 FOR IP): Performed by: HOSPITALIST

## 2024-11-11 PROCEDURE — 80048 BASIC METABOLIC PNL TOTAL CA: CPT

## 2024-11-11 PROCEDURE — 2580000003 HC RX 258: Performed by: INTERNAL MEDICINE

## 2024-11-11 PROCEDURE — 6360000002 HC RX W HCPCS: Performed by: INTERNAL MEDICINE

## 2024-11-11 PROCEDURE — 6370000000 HC RX 637 (ALT 250 FOR IP): Performed by: INTERNAL MEDICINE

## 2024-11-11 PROCEDURE — 36415 COLL VENOUS BLD VENIPUNCTURE: CPT

## 2024-11-11 PROCEDURE — 1100000000 HC RM PRIVATE

## 2024-11-11 PROCEDURE — 85025 COMPLETE CBC W/AUTO DIFF WBC: CPT

## 2024-11-11 PROCEDURE — 6360000002 HC RX W HCPCS: Performed by: HOSPITALIST

## 2024-11-11 RX ADMIN — ATENOLOL 12.5 MG: 25 TABLET ORAL at 10:23

## 2024-11-11 RX ADMIN — CEFTRIAXONE SODIUM 2000 MG: 2 INJECTION, POWDER, FOR SOLUTION INTRAMUSCULAR; INTRAVENOUS at 23:25

## 2024-11-11 RX ADMIN — ASPIRIN 81 MG: 81 TABLET, COATED ORAL at 10:23

## 2024-11-11 RX ADMIN — SERTRALINE 150 MG: 100 TABLET, FILM COATED ORAL at 10:22

## 2024-11-11 RX ADMIN — ENOXAPARIN SODIUM 40 MG: 100 INJECTION SUBCUTANEOUS at 10:23

## 2024-11-11 RX ADMIN — SODIUM CHLORIDE, PRESERVATIVE FREE 5 ML: 5 INJECTION INTRAVENOUS at 23:25

## 2024-11-11 RX ADMIN — LISINOPRIL 40 MG: 40 TABLET ORAL at 10:23

## 2024-11-11 RX ADMIN — ATORVASTATIN CALCIUM 80 MG: 40 TABLET, FILM COATED ORAL at 10:23

## 2024-11-11 RX ADMIN — PANTOPRAZOLE SODIUM 40 MG: 40 TABLET, DELAYED RELEASE ORAL at 06:22

## 2024-11-11 RX ADMIN — TAMSULOSIN HYDROCHLORIDE 0.4 MG: 0.4 CAPSULE ORAL at 10:22

## 2024-11-11 RX ADMIN — SODIUM CHLORIDE, PRESERVATIVE FREE 10 ML: 5 INJECTION INTRAVENOUS at 10:24

## 2024-11-11 NOTE — PLAN OF CARE
Problem: Pain  Goal: Verbalizes/displays adequate comfort level or baseline comfort level  11/11/2024 0510 by Shira Banks RN  Outcome: Progressing  11/10/2024 1709 by Mague Huber RN  Outcome: Progressing     Problem: Safety - Adult  Goal: Free from fall injury  11/11/2024 0510 by Shira Banks, RN  Outcome: Progressing  11/10/2024 1709 by Mague Huber, RN  Outcome: Progressing

## 2024-11-11 NOTE — PROGRESS NOTES
[facility]  Hospitalist Progress Note    NAME: Olu Garcia   :  1945   MRN:  [id number]     Total duration of encounter: 4 days      Interim Hospital Summary: 78 y.o. male who presented on 2024 with Sepsis (HCC). He has a past medical history of CAD (coronary artery disease), Depression, GERD (gastroesophageal reflux disease), and Hypertension..       Pt presented to ED with c/o fever and confusion.  ED w/u noted for SIRS and UTI.  Met Criteria for severe Sepsis with elevation in Lactate.   Rx Cefepime with BC and UC pending.  Lives in AL at [facility],  has poor memory in the AL unit.   Cultures grew Kleb same as at his ED visit  on Oct 23  Has had 2d Cefepime followed by Rocephin 3d including today.  F/u BC  was neg for growth     Subjective:     Chief Complaint / Reason for Physician Visit  \"OK\".  Discussed with EVENS Santiago, Eating  Pt remains very confused    Wife has been extra attentive  Pt has been in AL due to worsening confusion  Dx with Vascular Dementia  Managed by Dr. Ray    Review of Systems:  Symptom Y/N Comments  Symptom Y/N Comments   Fever/Chills n   Chest Pain n    Poor Appetite n   Edema n    Cough n   Abdominal Pain y    Sputum n   Joint Pain n    SOB/TORRES n   Pruritis/Rash n    Nausea/vomit n   Tolerating PT/OT y    Diarrhea n   Tolerating Diet y    Constipation n   Other         Current Facility-Administered Medications:     cefTRIAXone (ROCEPHIN) 2,000 mg in sodium chloride 0.9 % 50 mL IVPB (mini-bag), 2,000 mg, IntraVENous, Q24H, Ruel Ramos MD, Stopped at 24 0002    lisinopril (PRINIVIL;ZESTRIL) tablet 40 mg, 40 mg, Oral, Daily, Jose Arthur MD, 40 mg at 24 1023    aspirin EC tablet 81 mg, 81 mg, Oral, Daily, Freida Tran MD, 81 mg at 24 1023    atenolol (TENORMIN) tablet 12.5 mg, 12.5 mg, Oral, Daily, Jose Arthur MD, 12.5 mg at 24 1023    atorvastatin (LIPITOR) tablet 80 mg, 80 mg, Oral, Daily, Marc,  (arteriosclerotic cardiovascular disease)    Elevated troponin    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 Room 130/01  Disposition: TBD, likely home when stable  Admission status:  Inpatient    Reviewed most current lab test results and cultures  YES  Reviewed most current radiology test results   YES  Review and summation of old records today    NO  Reviewed patient's current orders and MAR    YES  PMH/ reviewed - no change compared to H&P    Care Plan discussed with:                                   Comments  Patient x     Family  x Wife (in room)   RN x     Care Manager  x     Consultant                          x Multidiciplinary team rounds were held today with , nursing, pharmacist and clinical coordinator.  Patient's plan of care was discussed; medications were reviewed and discharge planning was addressed.        ____________________________________________    Total NON Critical Care TIME:  35   Minutes        Comments   >50% of visit spent in counseling and coordination of care x      Signed: Ruel Ramos MD  [facility] Hospitalist  [phone]

## 2024-11-11 NOTE — PLAN OF CARE
Problem: Pain  Goal: Verbalizes/displays adequate comfort level or baseline comfort level  11/11/2024 1135 by Anuradha Johnson, RN  Outcome: Progressing  11/11/2024 1135 by Anuradha Johnson RN  Outcome: Progressing  11/11/2024 0510 by Shira Banks, RN  Outcome: Progressing     Problem: Safety - Adult  Goal: Free from fall injury  11/11/2024 1135 by Anuradha Johnson, RN  Outcome: Progressing  11/11/2024 1135 by Anuradha Johnson RN  Outcome: Progressing  11/11/2024 0510 by Shira Banks, RN  Outcome: Progressing

## 2024-11-11 NOTE — CARE COORDINATION
Transition of Care Plan:    RUR: 13% LOW   Prior Level of Functioning: Req assistance w/adls/iadls  Disposition: Likely SNF at [facility]   JOE: 11/12   If SNF or IPR: Date FOC offered:   Date FOC received:   Accepting facility:   Date authorization started with reference number:   Date authorization received and expires:   Follow up appointments: Defer to [facility]   DME needed: Defer to [facility]   Transportation at discharge:   IM/IMM Medicare/ letter given: 1st IMM obtained today 11/8  Is patient a [place] and connected with VA?    If yes, was  transfer form completed and VA notified?   Caregiver Contact:   Discharge Caregiver contacted prior to discharge?   Care Conference needed?   Barriers to discharge: Medical instability     1131: Per MD in IDR, okay to dc tomorrow. PT/OT orders placed to see if patient go to [facility] center under SNF. He is currently in AL. Received voicemail from Ashlee at [facility] inquiring about dc status. Called her back at [phone]. She asked what recommendation was. Told her therapy hasn't worked with him at all--no orders but likely SNF. Told her no PT present today but will send OT note when available. Sent all other info to Ashlee via e-mail.

## 2024-11-12 ENCOUNTER — APPOINTMENT (OUTPATIENT)
Facility: HOSPITAL | Age: 79
DRG: 872 | End: 2024-11-12
Payer: MEDICARE

## 2024-11-12 LAB
ANION GAP SERPL CALC-SCNC: 7 MMOL/L (ref 2–12)
BASOPHILS # BLD: 0 K/UL (ref 0–0.1)
BASOPHILS NFR BLD: 0 % (ref 0–1)
BUN SERPL-MCNC: 13 MG/DL (ref 6–20)
BUN/CREAT SERPL: 15 (ref 12–20)
CALCIUM SERPL-MCNC: 8.6 MG/DL (ref 8.5–10.1)
CHLORIDE SERPL-SCNC: 104 MMOL/L (ref 97–108)
CO2 SERPL-SCNC: 28 MMOL/L (ref 21–32)
CREAT SERPL-MCNC: 0.87 MG/DL (ref 0.7–1.3)
DIFFERENTIAL METHOD BLD: ABNORMAL
EOSINOPHIL # BLD: 0.1 K/UL (ref 0–0.4)
EOSINOPHIL NFR BLD: 1 % (ref 0–7)
ERYTHROCYTE [DISTWIDTH] IN BLOOD BY AUTOMATED COUNT: 12.4 % (ref 11.5–14.5)
GLUCOSE SERPL-MCNC: 88 MG/DL (ref 65–100)
HCT VFR BLD AUTO: 37.5 % (ref 36.6–50.3)
HGB BLD-MCNC: 12.6 G/DL (ref 12.1–17)
IMM GRANULOCYTES # BLD AUTO: 0.1 K/UL (ref 0–0.04)
IMM GRANULOCYTES NFR BLD AUTO: 1 % (ref 0–0.5)
LYMPHOCYTES # BLD: 1 K/UL (ref 0.8–3.5)
LYMPHOCYTES NFR BLD: 10 % (ref 12–49)
MCH RBC QN AUTO: 31.7 PG (ref 26–34)
MCHC RBC AUTO-ENTMCNC: 33.6 G/DL (ref 30–36.5)
MCV RBC AUTO: 94.2 FL (ref 80–99)
MONOCYTES # BLD: 0.7 K/UL (ref 0–1)
MONOCYTES NFR BLD: 7 % (ref 5–13)
NEUTS SEG # BLD: 8 K/UL (ref 1.8–8)
NEUTS SEG NFR BLD: 81 % (ref 32–75)
NRBC # BLD: 0 K/UL (ref 0–0.01)
NRBC BLD-RTO: 0 PER 100 WBC
PLATELET # BLD AUTO: 161 K/UL (ref 150–400)
PMV BLD AUTO: 12.5 FL (ref 8.9–12.9)
POTASSIUM SERPL-SCNC: 3.5 MMOL/L (ref 3.5–5.1)
RBC # BLD AUTO: 3.98 M/UL (ref 4.1–5.7)
SODIUM SERPL-SCNC: 139 MMOL/L (ref 136–145)
WBC # BLD AUTO: 9.9 K/UL (ref 4.1–11.1)

## 2024-11-12 PROCEDURE — 85025 COMPLETE CBC W/AUTO DIFF WBC: CPT

## 2024-11-12 PROCEDURE — 97535 SELF CARE MNGMENT TRAINING: CPT

## 2024-11-12 PROCEDURE — 97161 PT EVAL LOW COMPLEX 20 MIN: CPT

## 2024-11-12 PROCEDURE — 6360000002 HC RX W HCPCS: Performed by: HOSPITALIST

## 2024-11-12 PROCEDURE — 80048 BASIC METABOLIC PNL TOTAL CA: CPT

## 2024-11-12 PROCEDURE — 76770 US EXAM ABDO BACK WALL COMP: CPT

## 2024-11-12 PROCEDURE — 97165 OT EVAL LOW COMPLEX 30 MIN: CPT

## 2024-11-12 PROCEDURE — 2580000003 HC RX 258: Performed by: HOSPITALIST

## 2024-11-12 PROCEDURE — 6370000000 HC RX 637 (ALT 250 FOR IP): Performed by: HOSPITALIST

## 2024-11-12 PROCEDURE — 1100000000 HC RM PRIVATE

## 2024-11-12 PROCEDURE — 36415 COLL VENOUS BLD VENIPUNCTURE: CPT

## 2024-11-12 PROCEDURE — 94760 N-INVAS EAR/PLS OXIMETRY 1: CPT

## 2024-11-12 PROCEDURE — 6370000000 HC RX 637 (ALT 250 FOR IP): Performed by: INTERNAL MEDICINE

## 2024-11-12 RX ADMIN — TAMSULOSIN HYDROCHLORIDE 0.4 MG: 0.4 CAPSULE ORAL at 09:29

## 2024-11-12 RX ADMIN — SODIUM CHLORIDE, PRESERVATIVE FREE 10 ML: 5 INJECTION INTRAVENOUS at 20:20

## 2024-11-12 RX ADMIN — PANTOPRAZOLE SODIUM 40 MG: 40 TABLET, DELAYED RELEASE ORAL at 07:44

## 2024-11-12 RX ADMIN — SERTRALINE 150 MG: 100 TABLET, FILM COATED ORAL at 09:29

## 2024-11-12 RX ADMIN — ATENOLOL 12.5 MG: 25 TABLET ORAL at 09:28

## 2024-11-12 RX ADMIN — LISINOPRIL 40 MG: 40 TABLET ORAL at 09:29

## 2024-11-12 RX ADMIN — ENOXAPARIN SODIUM 40 MG: 100 INJECTION SUBCUTANEOUS at 09:30

## 2024-11-12 RX ADMIN — ASPIRIN 81 MG: 81 TABLET, COATED ORAL at 09:29

## 2024-11-12 RX ADMIN — SODIUM CHLORIDE, PRESERVATIVE FREE 10 ML: 5 INJECTION INTRAVENOUS at 09:30

## 2024-11-12 RX ADMIN — ATORVASTATIN CALCIUM 80 MG: 40 TABLET, FILM COATED ORAL at 09:28

## 2024-11-12 ASSESSMENT — PAIN SCALES - GENERAL: PAINLEVEL_OUTOF10: 0

## 2024-11-12 NOTE — PLAN OF CARE
Problem: Pain  Goal: Verbalizes/displays adequate comfort level or baseline comfort level  11/12/2024 1409 by Araceli Scott RN  Outcome: Progressing  11/12/2024 0948 by Araceli Scott RN  Outcome: Progressing  11/12/2024 0517 by Shira Banks RN  Outcome: Progressing     Problem: Safety - Adult  Goal: Free from fall injury  11/12/2024 1409 by Araceli Scott RN  Outcome: Progressing  11/12/2024 0948 by Araceli Scott RN  Outcome: Progressing  11/12/2024 0517 by Shira Banks RN  Outcome: Progressing     Problem: Skin/Tissue Integrity  Goal: Absence of new skin breakdown  Description: 1.  Monitor for areas of redness and/or skin breakdown  2.  Assess vascular access sites hourly  3.  Every 4-6 hours minimum:  Change oxygen saturation probe site  4.  Every 4-6 hours:  If on nasal continuous positive airway pressure, respiratory therapy assess nares and determine need for appliance change or resting period.  11/12/2024 1409 by Araceli Scott RN  Outcome: Progressing  11/12/2024 0948 by Araceli Scott RN  Outcome: Progressing     Problem: Physical Therapy - Adult  Goal: By Discharge: Performs mobility at highest level of function for planned discharge setting.  See evaluation for individualized goals.  11/12/2024 1238 by Benson Rascon, PT  Outcome: Adequate for Discharge

## 2024-11-12 NOTE — PLAN OF CARE
Problem: Occupational Therapy - Adult  Goal: By Discharge: Performs self-care activities at highest level of function for planned discharge setting.  See evaluation for individualized goals.  Outcome: Adequate for Discharge  Note: FUNCTIONAL STATUS PRIOR TO ADMISSION:  Patient was ambulatory using @WW  Receives Help From:  (Staff at [facility]), ADL Assistance: Needs assistanceHomemaking Assistance: Needs assistance, Ambulation Assistance: Needs assistance, Transfer Assistance: Needs assistance, Active : No     HOME SUPPORT: Patient lived at [facility] and had 24/7 care and assistance available. Required assistance with BADLs and utilized RW for mobility.  OCCUPATIONAL THERAPY EVALUATION    Patient: Olu Garcia (78 y.o. male)  Date: 11/12/2024  Primary Diagnosis: Acute coronary syndrome (HCC) [I24.9]  Sepsis (HCC) [A41.9]  Severe sepsis with acute organ dysfunction (HCC) [A41.9, R65.20]  Urinary tract infection without hematuria, site unspecified [N39.0]  Sepsis, due to unspecified organism, unspecified whether acute organ dysfunction present (HCC) [A41.9]         Precautions: Fall Risk      ASSESSMENT :  The patient is limited by decreased functional mobility, independence in ADLs, high-level IADLs, endurance, safety awareness, cognition, balance.    Based on the impairments listed above Pt presents at his functional baseline. Pt has 24/7 care available at [facility]. He is a poor historian and limited primarily by cognition. Pt has no further OT needs at this time and no further OT warranted. Recommend home with continued assistance with ADLS/IADLS and spv for safe mobility due to impaired cognition.     Functional Outcome Measure:  The patient scored 40/100 on the Barthel INdex outcome measure which is indicative of moderate disability.         PLAN :  Recommendations and Planned Interventions:   N/a eval only     Frequency/Duration: OT Plan of Care: 1 time/week    Recommendation for discharge: (in order for the patient  to meet his/her long term goals):   No skilled occupational therapy    Other factors to consider for discharge: poor safety awareness, impaired cognition, and high risk for falls    IF patient discharges home will need the following DME: patient owns DME required for discharge and continuing to assess with progress       SUBJECTIVE:   Patient stated, “Pt pleasantly confused, sitting at nurses station due to confusion.”  OBJECTIVE DATA SUMMARY:     Past Medical History:   Diagnosis Date    CAD (coronary artery disease)     Depression     GERD (gastroesophageal reflux disease)     Hypertension      Past Surgical History:   Procedure Laterality Date    CATARACT REMOVAL      NC UNLISTED PROCEDURE CARDIAC SURGERY      cath with stents          Expanded or extensive additional review of patient history:   Social/Functional History  Lives With: Spouse  Type of Home: Assisted living  Home Layout: One level  Home Access: Level entry  Bathroom Shower/Tub: Walk-in shower  Bathroom Toilet: Standard  Bathroom Equipment: Grab bars in shower  Bathroom Accessibility: Accessible  Home Equipment: Walker - Rolling  Has the patient had two or more falls in the past year or any fall with injury in the past year?: No  Receives Help From:  (Staff at [facility])  ADL Assistance: Needs assistance  Homemaking Assistance: Needs assistance  Homemaking Responsibilities: No  Ambulation Assistance: Needs assistance  Transfer Assistance: Needs assistance  Active : No  Patient's  Info: [facility]      Hand Dominance: right     EXAMINATION OF PERFORMANCE DEFICITS:    Cognitive/Behavioral Status:  Orientation  Overall Orientation Status: Impaired  Orientation Level: Disoriented to person;Disoriented to place;Disoriented to time;Disoriented to situation  Cognition  Overall Cognitive Status: Exceptions  Arousal/Alertness: Appears intact  Following Commands: Appears intact  Attention Span: Appears intact  Memory: Impaired  Safety Judgement:

## 2024-11-12 NOTE — PLAN OF CARE
Problem: Physical Therapy - Adult  Goal: By Discharge: Performs mobility at highest level of function for planned discharge setting.  See evaluation for individualized goals.  Outcome: Adequate for Discharge   PHYSICAL THERAPY EVALUATION/DISCHARGE    Patient: Olu Garcia (78 y.o. male)  Date: 11/12/2024  Primary Diagnosis: Acute coronary syndrome (HCC) [I24.9]  Sepsis (HCC) [A41.9]  Severe sepsis with acute organ dysfunction (HCC) [A41.9, R65.20]  Urinary tract infection without hematuria, site unspecified [N39.0]  Sepsis, due to unspecified organism, unspecified whether acute organ dysfunction present (HCC) [A41.9]       Precautions: Fall Risk   ; vascular dementia                   ASSESSMENT AND RECOMMENDATIONS:  Based on the objective data below, the patient presented at or near his functional baseline. Pt presented sitting in recliner chair w/ spouse present. Vitals taken as noted per flowsheet. Due to vascular dementia, pt is a poor historian so spouse Quin assisted w/ details of SH and PLOF. Due to hx of several falls, therapist obtained, adjusted and provided a 2WW for daily use. Limited cues required as pt was able to transfer to stand and then he was able to walk a continuous lap all the way around the nursing station w/ 2WW and CGA; no LOB; no SOB. He was guided back to his room and was eventually left resting in recliner chair w/ call bell in reach; chair alarm on; spouse present t/o session. Pt currently appears to be at or near his functional baseline and does not warrant any further skilled PT services at this time in the acute care hospital. Continued use of 2WW is highly recommended with supervision to help ensure safety. D/C PT but if [facility] is interested in SNF for their pt, this would be appropriate in an attempt to get him back to ILU apt w/ spouse vs. Living in [facility].    Functional Outcome Measure:  The patient scored 50/100 on the Barthel Index outcome measure which is indicative of being  Assistance: Needs assistance  Transfer Assistance: Needs assistance  Active : No  Patient's  Info: [facility]  Critical Behavior:  Orientation  Overall Orientation Status: Impaired  Orientation Level: Oriented to person;Disoriented to place;Disoriented to time;Disoriented to situation  Cognition  Overall Cognitive Status: Exceptions  Arousal/Alertness: Appears intact  Following Commands: Appears intact  Attention Span: Appears intact  Memory: Impaired;Decreased recall of recent events;Decreased short term memory;Decreased long term memory  Safety Judgement: Impaired;Decreased awareness of need for assistance;Decreased awareness of need for safety  Problem Solving: Impaired;Assistance required to generate solutions;Assistance required to implement solutions;Assistance required to identify errors made;Assistance required to correct errors made;Decreased awareness of errors  Insights: Decreased awareness of deficits  Initiation: Requires cues for some  Sequencing: Requires cues for some  Cognition Comment: noted vascular dementia    Hearing:   Hearing  Hearing: Within functional limits ([place])    Vision/Perceptual:    Vision - Basic Assessment  Prior Vision: Wears glasses all the time     Vision  Vision: Within Functional Limits  Vision Exceptions: Wears glasses for reading;Wears glasses for distance (2 pairs of glasses)       Strength:    Strength: Generally decreased, functional    Tone & Sensation:   Tone: Normal  Sensation: Intact    Coordination:  Coordination: Within functional limits    Range Of Motion:  AROM: Within functional limits       Functional Mobility:  Bed Mobility:     Bed Mobility Training  Bed Mobility Training: Yes  Overall Level of Assistance: Contact-guard assistance;Assist X1  Interventions: Safety awareness training;Verbal cues  Supine to Sit: Contact-guard assistance;Assist X1  Sit to Supine: Contact-guard assistance;Assist X1  Scooting: Contact-guard assistance;Assist X1  Transfers:

## 2024-11-12 NOTE — PLAN OF CARE
Problem: Pain  Goal: Verbalizes/displays adequate comfort level or baseline comfort level  11/12/2024 0948 by Araceli Scott, RN  Outcome: Progressing  11/12/2024 0517 by Shira Banks RN  Outcome: Progressing     Problem: Safety - Adult  Goal: Free from fall injury  11/12/2024 0948 by Araceli Scott, RN  Outcome: Progressing  11/12/2024 0517 by Shira Banks RN  Outcome: Progressing     Problem: Skin/Tissue Integrity  Goal: Absence of new skin breakdown  Description: 1.  Monitor for areas of redness and/or skin breakdown  2.  Assess vascular access sites hourly  3.  Every 4-6 hours minimum:  Change oxygen saturation probe site  4.  Every 4-6 hours:  If on nasal continuous positive airway pressure, respiratory therapy assess nares and determine need for appliance change or resting period.  Outcome: Progressing

## 2024-11-12 NOTE — PROGRESS NOTES
Occupational Therapy        OT orders received. OT eval completed. Pt is a very historian and unable to provide much hx. Pt requires SBA/CGA 100ft with RW and Min-Mod A for self care. Based on conversation with  this is likely near pts baseline.   Feel pt would be safe to return to [facility] with possible HH followup. Definite confusion noted but likely baseline?  Full evaluation to follow.     Thank You,   Karissa Johnson, OT

## 2024-11-12 NOTE — CARE COORDINATION
Transition of Care Plan:     RUR: 13% LOW   Prior Level of Functioning: Req assistance w/adls/iadls  Disposition: Likely SNF at [facility]   JOE: 11/13  If SNF or IPR: Date FOC offered:   Date FOC received:   Accepting facility:   Date authorization started with reference number:   Date authorization received and expires:   Follow up appointments: Defer to [facility]   DME needed: Defer to [facility]   Transportation at discharge:   IM/IMM Medicare/ letter given: 1st IMM obtained today 11/8  Is patient a [place] and connected with VA?               If yes, was  transfer form completed and VA notified?   Caregiver Contact:   Discharge Caregiver contacted prior to discharge?   Care Conference needed?   Barriers to discharge: Medical instability     0952: Spoke with OT. Okay to go back to AL. Mostly cognitive issues. Called Ashlee from [facility] to let her know. No answer. Left a message.     1055: Spoke with Ashlee. Told her in IDR MD wants patient to have US first and then talk to facility doctor Dr. Ray. Told her that we would like for him to be dcd today but may be unrealistic as it is already almost 11am. Told Ashlee that we are holding patients in ED and would be good if we could get him over there today but we will have to see what US shows and conversation with Dr. Ray goes.     1929: Spoke with [facility]). He said that they plan to take patient tomorrow in SNF/Healthcare center. He also said Dr. Ray was off today but back tomorrow 11/13.       NEED COVID TEST TOMORROW AM 11/13

## 2024-11-13 VITALS
DIASTOLIC BLOOD PRESSURE: 78 MMHG | TEMPERATURE: 98.2 F | WEIGHT: 163.7 LBS | OXYGEN SATURATION: 96 % | SYSTOLIC BLOOD PRESSURE: 160 MMHG | RESPIRATION RATE: 18 BRPM | HEART RATE: 62 BPM | BODY MASS INDEX: 24.24 KG/M2 | HEIGHT: 69 IN

## 2024-11-13 LAB
ANION GAP SERPL CALC-SCNC: 7 MMOL/L (ref 2–12)
BASOPHILS # BLD: 0 K/UL (ref 0–0.1)
BASOPHILS NFR BLD: 0 % (ref 0–1)
BUN SERPL-MCNC: 12 MG/DL (ref 6–20)
BUN/CREAT SERPL: 14 (ref 12–20)
CALCIUM SERPL-MCNC: 8.4 MG/DL (ref 8.5–10.1)
CHLORIDE SERPL-SCNC: 105 MMOL/L (ref 97–108)
CO2 SERPL-SCNC: 29 MMOL/L (ref 21–32)
CREAT SERPL-MCNC: 0.87 MG/DL (ref 0.7–1.3)
DIFFERENTIAL METHOD BLD: ABNORMAL
EOSINOPHIL # BLD: 0.2 K/UL (ref 0–0.4)
EOSINOPHIL NFR BLD: 2 % (ref 0–7)
ERYTHROCYTE [DISTWIDTH] IN BLOOD BY AUTOMATED COUNT: 12.5 % (ref 11.5–14.5)
GLUCOSE SERPL-MCNC: 95 MG/DL (ref 65–100)
HCT VFR BLD AUTO: 36.8 % (ref 36.6–50.3)
HGB BLD-MCNC: 12.3 G/DL (ref 12.1–17)
IMM GRANULOCYTES # BLD AUTO: 0.1 K/UL (ref 0–0.04)
IMM GRANULOCYTES NFR BLD AUTO: 2 % (ref 0–0.5)
LYMPHOCYTES # BLD: 1.1 K/UL (ref 0.8–3.5)
LYMPHOCYTES NFR BLD: 12 % (ref 12–49)
MCH RBC QN AUTO: 31.3 PG (ref 26–34)
MCHC RBC AUTO-ENTMCNC: 33.4 G/DL (ref 30–36.5)
MCV RBC AUTO: 93.6 FL (ref 80–99)
MONOCYTES # BLD: 0.9 K/UL (ref 0–1)
MONOCYTES NFR BLD: 10 % (ref 5–13)
NEUTS SEG # BLD: 6.4 K/UL (ref 1.8–8)
NEUTS SEG NFR BLD: 74 % (ref 32–75)
NRBC # BLD: 0 K/UL (ref 0–0.01)
NRBC BLD-RTO: 0 PER 100 WBC
PLATELET # BLD AUTO: 165 K/UL (ref 150–400)
PMV BLD AUTO: 12.3 FL (ref 8.9–12.9)
POTASSIUM SERPL-SCNC: 3.5 MMOL/L (ref 3.5–5.1)
RBC # BLD AUTO: 3.93 M/UL (ref 4.1–5.7)
SARS-COV-2 RNA RESP QL NAA+PROBE: NOT DETECTED
SODIUM SERPL-SCNC: 141 MMOL/L (ref 136–145)
SOURCE: NORMAL
WBC # BLD AUTO: 8.6 K/UL (ref 4.1–11.1)

## 2024-11-13 PROCEDURE — 2580000003 HC RX 258: Performed by: HOSPITALIST

## 2024-11-13 PROCEDURE — 36415 COLL VENOUS BLD VENIPUNCTURE: CPT

## 2024-11-13 PROCEDURE — 85025 COMPLETE CBC W/AUTO DIFF WBC: CPT

## 2024-11-13 PROCEDURE — 6360000002 HC RX W HCPCS: Performed by: HOSPITALIST

## 2024-11-13 PROCEDURE — 6370000000 HC RX 637 (ALT 250 FOR IP): Performed by: INTERNAL MEDICINE

## 2024-11-13 PROCEDURE — 80048 BASIC METABOLIC PNL TOTAL CA: CPT

## 2024-11-13 PROCEDURE — 94760 N-INVAS EAR/PLS OXIMETRY 1: CPT

## 2024-11-13 PROCEDURE — 87635 SARS-COV-2 COVID-19 AMP PRB: CPT

## 2024-11-13 PROCEDURE — 6370000000 HC RX 637 (ALT 250 FOR IP): Performed by: HOSPITALIST

## 2024-11-13 RX ORDER — CIPROFLOXACIN 250 MG/1
500 TABLET, FILM COATED ORAL EVERY 12 HOURS SCHEDULED
Status: DISCONTINUED | OUTPATIENT
Start: 2024-11-13 | End: 2024-11-13 | Stop reason: HOSPADM

## 2024-11-13 RX ORDER — CIPROFLOXACIN 500 MG/1
500 TABLET, FILM COATED ORAL EVERY 12 HOURS SCHEDULED
Qty: 20 TABLET | Refills: 0 | Status: SHIPPED | OUTPATIENT
Start: 2024-11-13 | End: 2024-11-23

## 2024-11-13 RX ADMIN — TAMSULOSIN HYDROCHLORIDE 0.4 MG: 0.4 CAPSULE ORAL at 08:55

## 2024-11-13 RX ADMIN — SODIUM CHLORIDE, PRESERVATIVE FREE 10 ML: 5 INJECTION INTRAVENOUS at 08:56

## 2024-11-13 RX ADMIN — ATORVASTATIN CALCIUM 80 MG: 40 TABLET, FILM COATED ORAL at 08:55

## 2024-11-13 RX ADMIN — PANTOPRAZOLE SODIUM 40 MG: 40 TABLET, DELAYED RELEASE ORAL at 05:22

## 2024-11-13 RX ADMIN — ASPIRIN 81 MG: 81 TABLET, COATED ORAL at 08:56

## 2024-11-13 RX ADMIN — SERTRALINE 150 MG: 100 TABLET, FILM COATED ORAL at 08:55

## 2024-11-13 RX ADMIN — ENOXAPARIN SODIUM 40 MG: 100 INJECTION SUBCUTANEOUS at 08:56

## 2024-11-13 RX ADMIN — LISINOPRIL 40 MG: 40 TABLET ORAL at 08:56

## 2024-11-13 RX ADMIN — CIPROFLOXACIN HYDROCHLORIDE 500 MG: 250 TABLET, FILM COATED ORAL at 08:56

## 2024-11-13 RX ADMIN — ATENOLOL 12.5 MG: 25 TABLET ORAL at 08:55

## 2024-11-13 NOTE — PROGRESS NOTES
Discharge Summary    Olu Garcia  :  1945  MRN:  [id number]    ADMIT DATE:  2024  DISCHARGE DATE:  2024      Discharge instruction reviewed with patient. Report called and given to Keren Cabral at [facility] [phone]    Home med's returned n/a    Personal belongings returned Yes    Patient Wheeled to front entrance via wheelchair with Nurse and [facility] transport.         SIGNED:    Anuradha Johnson RN

## 2024-11-13 NOTE — PLAN OF CARE
Problem: Pain  Goal: Verbalizes/displays adequate comfort level or baseline comfort level  11/13/2024 1113 by Anuradha Johnson RN  Outcome: Progressing  11/13/2024 0012 by Freida Dang RN  Outcome: Progressing     Problem: Safety - Adult  Goal: Free from fall injury  11/13/2024 1113 by Anuradha Johnson RN  Outcome: Progressing  11/13/2024 0012 by Freida Dang RN  Outcome: Progressing     Problem: Skin/Tissue Integrity  Goal: Absence of new skin breakdown  Description: 1.  Monitor for areas of redness and/or skin breakdown  2.  Assess vascular access sites hourly  3.  Every 4-6 hours minimum:  Change oxygen saturation probe site  4.  Every 4-6 hours:  If on nasal continuous positive airway pressure, respiratory therapy assess nares and determine need for appliance change or resting period.  11/13/2024 1113 by Anuradha Johnson RN  Outcome: Progressing  11/13/2024 0012 by Freida Dang RN  Outcome: Progressing

## 2024-11-13 NOTE — CARE COORDINATION
1317: Received voicemail from [facility]. Now they are saying he is going to AL. I did tell Ashlee that was the recommendation yesterday. Number to give report is [phone]      Also received a voicemail from Ashlee. [phone] called her back. And told her everything was sent to [facility]. She will get with [facility] for dc info. Also, have to wait for [facility] to put in their med orders before patient leaves. Wife not in presently. Wanted to see if she was going to take him to [facility] or if it would be [facility] van. [facility] made aware.       1341: Received a call from Soha. She asked for AVS sent to her e-mail at [email or web address]       1421: Wife not here. Called her. No answer. Let her know via voicemail will just get [facility] to pick her up. Ashlee aware. Will send RW to pick him up. CM and Primary nurse got patient dressed. He is ready to go.

## 2024-11-13 NOTE — CARE COORDINATION
11/13/24 1204   Services At/After Discharge   Transition of Care Consult (CM Consult) SNF  ([facility])   Partner SNF No   Reason Why Partner SNF Not Chosen Location   Services At/After Discharge Skilled Nursing Facility (SNF)   [place] Resource Information Provided? No   Mode of Transport at Discharge Self   Confirm Follow Up Transport Family   Condition of Participation: Discharge Planning   The Plan for Transition of Care is related to the following treatment goals: Gain strength and endurance--[facility] SNF   The Patient and/or Patient Representative was provided with a Choice of Provider? Patient   The Patient and/Or Patient Representative agree with the Discharge Plan? Yes   Freedom of Choice list was provided with basic dialogue that supports the patient's individualized plan of care/goals, treatment preferences, and shares the quality data associated with the providers?  Yes       Mr. Garcia is being discharged to [facility] (SNF). Patient/family agree with dc plan. Aware to contact CM for any further questions/concerns.          Transition of Care Plan:     RUR: 13% LOW   Prior Level of Functioning: Req assistance w/adls/iadls  Disposition: Likely SNF at [facility]   JOE: 11/13  If SNF or IPR: Date FOC offered:   Date FOC received:   Accepting facility:   Date authorization started with reference number:   Date authorization received and expires:   Follow up appointments: Defer to [facility]   DME needed: Defer to [facility]   Transportation at discharge:   IM/IMM Medicare/ letter given: 1st IMM obtained  11/8, 2nd obtained 11/12   Is patient a  and connected with VA?               If yes, was [place] transfer form completed and VA notified?   Caregiver Contact:   Discharge Caregiver contacted prior to discharge?   Care Conference needed?   Barriers to discharge: None           Transition of Care Plan to SNF/Rehab    Communication to Patient/Family:  Met with patient and family and they are agreeable to the  pt/family  []A screening has previously been completed.    []Level II Completed    [x] Private pay individual who will not become   financially eligible for Medicaid within 6 months from admission to a [facility].     [] Individual refused to have screening conducted.     []Medicaid Application Completed    []The screening denied because it was determined individual did not need/did not qualify for nursing facility level of care.  [] Out of state residents seeking direct admission to a VA nursing facility.  [] Individuals who are inpatients of an out of state hospital, or in state or out of state veterans/ hospital and seek direct admission to a VA nursing facility  [] Individuals who are pateints or residents of a state owned/operated facility that is licensed by Department of Behavioral Services (DBHDS) and seek direct admission to VA nursing facility  [] A screening not required for enrollment in Medicaid Hospice services as set out in 12 VAC 30-  [] [facility] ([facility]) staff shall perform screenings of the [facility] clients.    Advanced Care Plan:  []Surrogate Decision Maker of Care  []POA  [x]Communicated Code Status and copy sent.    Other:

## 2024-11-13 NOTE — PLAN OF CARE
Problem: Safety - Adult  Goal: Free from fall injury  11/13/2024 0012 by Freida Dang, RN  Outcome: Progressing  11/12/2024 1409 by Araceli Scott, RN  Outcome: Progressing     Problem: Skin/Tissue Integrity  Goal: Absence of new skin breakdown  Description: 1.  Monitor for areas of redness and/or skin breakdown  2.  Assess vascular access sites hourly  3.  Every 4-6 hours minimum:  Change oxygen saturation probe site  4.  Every 4-6 hours:  If on nasal continuous positive airway pressure, respiratory therapy assess nares and determine need for appliance change or resting period.  11/13/2024 0012 by Freida Dang, RN  Outcome: Progressing  11/12/2024 1409 by Araceli Scott, RN  Outcome: Progressing

## 2024-11-13 NOTE — PROGRESS NOTES
80 mg, 80 mg, Oral, Daily, Freida Tran MD, 80 mg at 11/12/24 0928    pantoprazole (PROTONIX) tablet 40 mg, 40 mg, Oral, QAM AC, Freida Tran MD, 40 mg at 11/12/24 0744    sertraline (ZOLOFT) tablet 150 mg, 150 mg, Oral, Daily, Freida Tran MD, 150 mg at 11/12/24 0929    tamsulosin (FLOMAX) capsule 0.4 mg, 0.4 mg, Oral, Daily, Freida Tran MD, 0.4 mg at 11/12/24 0929    sodium chloride flush 0.9 % injection 5-40 mL, 5-40 mL, IntraVENous, 2 times per day, Jose Arthur MD, 10 mL at 11/12/24 2020    sodium chloride flush 0.9 % injection 5-40 mL, 5-40 mL, IntraVENous, PRN, Jose Arthur MD    0.9 % sodium chloride infusion, , IntraVENous, PRN, Jose Arthur MD, Last Rate: 20 mL/hr at 11/10/24 2054, 20 mL/hr at 11/10/24 2054    enoxaparin (LOVENOX) injection 40 mg, 40 mg, SubCUTAneous, Daily, Jose Arthur MD, 40 mg at 11/12/24 0930    acetaminophen (TYLENOL) tablet 650 mg, 650 mg, Oral, Q6H PRN, 650 mg at 11/10/24 2051 **OR** acetaminophen (TYLENOL) suppository 650 mg, 650 mg, Rectal, Q6H PRN, Jose Arthur MD    aluminum & magnesium hydroxide-simethicone (MAALOX) 200-200-20 MG/5ML suspension 30 mL, 30 mL, Oral, Q6H PRN, Jose Arthur MD    ondansetron (ZOFRAN) injection 4 mg, 4 mg, IntraVENous, Q6H PRN, Jose Arthur MD    Objective:     VITALS:   Vitals:    11/12/24 0828 11/12/24 1035 11/12/24 1200 11/12/24 1605   BP: (!) 186/92  (!) 140/76 (!) 167/76   Pulse: 70 58 58 57   Resp: 18      Temp: 98.2 °F (36.8 °C)   98.3 °F (36.8 °C)   TempSrc: Oral      SpO2: 94% 92%     Weight:       Height:            Intake/Output Summary (Last 24 hours) at 11/12/2024 2042  Last data filed at 11/12/2024 1311  Gross per 24 hour   Intake 485 ml   Output --   Net 485 ml        PHYSICAL EXAM:  General: WD, WN. Alert, cooperative, no acute distress    EENT:  EOMI. Anicteric sclerae. MMM  Resp:  CTA bilaterally, no wheezing or rales.  No accessory muscle use  CV:  Regular  rhythm,   and details which were not copied into this note but were reviewed prior to creation of Plan.        Assessment / Plan:    Principal Problem:    Sepsis (HCC)  Active Problems:    UTI - Klebsiella    Bacteremia - Klebsiella  Cont tx Flomax 0.4mg daily.  No significant residual or obstruction  Empiric tx Cefepime  --> change to Rocephin 2g daily this evening (plan 5d minimum IV total)  Urine and Blood Positive same Suscept pattern  F/u BC 11/8  neg to date  Will d/c on oral for additional 9d - Tuesday earliest  (14 day total tx)  d/c to [facility]  Renal U/S to assess for stones / obstruction / hydronephrosis / outflow obstruction with retention - not completed till late Tue  Was tx for same with Cefdinir 300mg bid x 10d from 10/23/24 till 11/2, readmitted ill on 11/7  Will d/c on Cipro 500mg bid x 10 days following the 5d IV treatment during admission      ASCVD (arteriosclerotic cardiovascular disease)    Elevated troponin    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  Maintain meds as PTA  ASA 81mg, Atenolol 12.5mg, Lipitor 80mg, Lisinopril 40mg      Essential hypertension  Atenolol 12.5mg, Lisinopril 40mg      Hypercholesterolemia  Lipitor 80mg daily      GERD (gastroesophageal reflux disease)  Protonix 40mg daily    ______________________________________________________________________  SAFETY:   Code Status:Full  DVT prophylaxis:  Lovenox  Stress Ulcer prophylaxis: Protonix  Bladder catheter: no  Family Contact Info:  Primary Emergency Contact: Emma Garcia, Home Phone: [phone]  Bedded: [facility] Room 130/01  Disposition: TBD, d/c to [facility] for 9d   Admission status:  Inpatient    Reviewed most current lab test results and cultures  YES  Reviewed most current radiology test results   YES  Review and summation of old records today    NO  Reviewed patient's current orders and MAR    YES  PMH/ reviewed - no change compared to H&P    Care Plan discussed with:

## 2024-11-13 NOTE — DISCHARGE SUMMARY
Discharge Summary    Name: Olu Garcia  [id number]  YOB: 1945 (Age: 78 y.o.)   Date of Admission: 11/7/2024  Date of Discharge: 11/13/2024  Attending Physician: Freida Tran MD    Discharge Diagnosis:   Sepsis  Acute cystitis  Bacteremia with blood cultures positive for Klebsiella  Elevated Troponin    Secondary Diagnosis:  GERD  Hyperlipidemia  Hypertension  Vascular dementia  ASCVD    Consultations:  None      Brief Admission History/Reason for Admission   Olu Garcia is a 78 y.o.  male with PMHx significant for coronary artery disease status post stents, hypertension, depression hyperlipidemia who presented to the emergency department with fever and confusion.  Per emergency department notes, it appears as though he has been lethargic and incontinent of urine with a fever of 103.5 at his nursing home and was taken to the emergency department for further evaluation.  He was requiring 2 L of oxygen via nasal cannula to maintain his oxygen saturations.  No further history could be reliably obtained.     In the emergency department, he was satting well on 2 L of oxygen via nasal cannula and was noted to be mildly tachypneic but otherwise vital signs within normal limits.  The CBC was significant for white blood cell count of 22 and a CMP was relatively normal.  Initial lactic acid was 2.2 with repeat of 1.4.  Initial troponin was 130 with repeat of 165.  UA was concerning for urinary tract infection.  A chest x-ray showed bilateral interstitial edema.  He was given IV cefepime in the emergency department and he was admitted to the Hospitalist service for further management.  Please see H&P for additional details.    Brief Hospital Course by Main Problems:     Severe sepsis secondary to urinary tract infection:  Workup in the emergency department was significant for SIRS criteria of leukocytosis and tachypnea with source of infection being urinary tract

## 2024-11-15 LAB
BACTERIA SPEC CULT: NORMAL
SERVICE CMNT-IMP: NORMAL

## 2024-11-16 ENCOUNTER — HOSPITAL ENCOUNTER (OUTPATIENT)
Facility: HOSPITAL | Age: 79
Setting detail: SPECIMEN
Discharge: HOME | End: 2024-11-19
Payer: COMMERCIAL

## 2024-11-16 PROCEDURE — 85025 COMPLETE CBC W/AUTO DIFF WBC: CPT

## 2024-11-17 ENCOUNTER — HOSPITAL ENCOUNTER (OUTPATIENT)
Facility: HOSPITAL | Age: 79
Setting detail: SPECIMEN
Discharge: HOME | End: 2024-11-20

## 2024-11-17 LAB
BASOPHILS # BLD: 0 K/UL (ref 0–0.1)
BASOPHILS NFR BLD: 0 % (ref 0–1)
DIFFERENTIAL METHOD BLD: ABNORMAL
EOSINOPHIL # BLD: 0.1 K/UL (ref 0–0.4)
EOSINOPHIL NFR BLD: 1 % (ref 0–7)
ERYTHROCYTE [DISTWIDTH] IN BLOOD BY AUTOMATED COUNT: 12.5 % (ref 11.5–14.5)
HCT VFR BLD AUTO: 35.3 % (ref 36.6–50.3)
HGB BLD-MCNC: 11.8 G/DL (ref 12.1–17)
IMM GRANULOCYTES # BLD AUTO: 0.1 K/UL (ref 0–0.04)
IMM GRANULOCYTES NFR BLD AUTO: 1 % (ref 0–0.5)
LYMPHOCYTES # BLD: 1 K/UL (ref 0.8–3.5)
LYMPHOCYTES NFR BLD: 11 % (ref 12–49)
MCH RBC QN AUTO: 31.5 PG (ref 26–34)
MCHC RBC AUTO-ENTMCNC: 33.4 G/DL (ref 30–36.5)
MCV RBC AUTO: 94.1 FL (ref 80–99)
MONOCYTES # BLD: 0.6 K/UL (ref 0–1)
MONOCYTES NFR BLD: 7 % (ref 5–13)
NEUTS SEG # BLD: 7.5 K/UL (ref 1.8–8)
NEUTS SEG NFR BLD: 80 % (ref 32–75)
NRBC # BLD: 0 K/UL (ref 0–0.01)
NRBC BLD-RTO: 0 PER 100 WBC
PLATELET # BLD AUTO: 195 K/UL (ref 150–400)
PMV BLD AUTO: 12.7 FL (ref 8.9–12.9)
RBC # BLD AUTO: 3.75 M/UL (ref 4.1–5.7)
WBC # BLD AUTO: 9.3 K/UL (ref 4.1–11.1)

## 2024-11-24 ENCOUNTER — HOSPITAL ENCOUNTER (OUTPATIENT)
Facility: HOSPITAL | Age: 79
Setting detail: SPECIMEN
Discharge: HOME | End: 2024-11-27

## 2024-11-24 PROCEDURE — 87086 URINE CULTURE/COLONY COUNT: CPT

## 2024-11-26 LAB
BACTERIA SPEC CULT: NORMAL
SERVICE CMNT-IMP: NORMAL

## 2024-12-02 ENCOUNTER — HOSPITAL ENCOUNTER (OUTPATIENT)
Facility: HOSPITAL | Age: 79
Setting detail: SPECIMEN
Discharge: HOME | End: 2024-12-05
Payer: COMMERCIAL

## 2024-12-02 PROCEDURE — 85025 COMPLETE CBC W/AUTO DIFF WBC: CPT

## 2024-12-02 PROCEDURE — 80053 COMPREHEN METABOLIC PANEL: CPT

## 2024-12-02 PROCEDURE — 36415 COLL VENOUS BLD VENIPUNCTURE: CPT

## 2024-12-03 LAB
ALBUMIN SERPL-MCNC: 2.9 G/DL (ref 3.5–5)
ALBUMIN/GLOB SERPL: 0.8 (ref 1.1–2.2)
ALP SERPL-CCNC: 85 U/L (ref 45–117)
ALT SERPL-CCNC: 25 U/L (ref 12–78)
ANION GAP SERPL CALC-SCNC: 9 MMOL/L (ref 2–12)
AST SERPL-CCNC: 28 U/L (ref 15–37)
BASOPHILS # BLD: 0 K/UL (ref 0–0.1)
BASOPHILS NFR BLD: 0 % (ref 0–1)
BILIRUB SERPL-MCNC: 0.5 MG/DL (ref 0.2–1)
BUN SERPL-MCNC: 10 MG/DL (ref 6–20)
BUN/CREAT SERPL: 11 (ref 12–20)
CALCIUM SERPL-MCNC: 8.4 MG/DL (ref 8.5–10.1)
CHLORIDE SERPL-SCNC: 104 MMOL/L (ref 97–108)
CO2 SERPL-SCNC: 28 MMOL/L (ref 21–32)
CREAT SERPL-MCNC: 0.95 MG/DL (ref 0.7–1.3)
DIFFERENTIAL METHOD BLD: ABNORMAL
EOSINOPHIL # BLD: 0.1 K/UL (ref 0–0.4)
EOSINOPHIL NFR BLD: 2 % (ref 0–7)
ERYTHROCYTE [DISTWIDTH] IN BLOOD BY AUTOMATED COUNT: 13.3 % (ref 11.5–14.5)
GLOBULIN SER CALC-MCNC: 3.6 G/DL (ref 2–4)
GLUCOSE SERPL-MCNC: 102 MG/DL (ref 65–100)
HCT VFR BLD AUTO: 37.4 % (ref 36.6–50.3)
HGB BLD-MCNC: 12.2 G/DL (ref 12.1–17)
IMM GRANULOCYTES # BLD AUTO: 0 K/UL (ref 0–0.04)
IMM GRANULOCYTES NFR BLD AUTO: 0 % (ref 0–0.5)
LYMPHOCYTES # BLD: 1 K/UL (ref 0.8–3.5)
LYMPHOCYTES NFR BLD: 24 % (ref 12–49)
MCH RBC QN AUTO: 30.6 PG (ref 26–34)
MCHC RBC AUTO-ENTMCNC: 32.6 G/DL (ref 30–36.5)
MCV RBC AUTO: 93.7 FL (ref 80–99)
MONOCYTES # BLD: 0.3 K/UL (ref 0–1)
MONOCYTES NFR BLD: 8 % (ref 5–13)
NEUTS SEG # BLD: 2.8 K/UL (ref 1.8–8)
NEUTS SEG NFR BLD: 66 % (ref 32–75)
NRBC # BLD: 0 K/UL (ref 0–0.01)
NRBC BLD-RTO: 0 PER 100 WBC
PLATELET # BLD AUTO: 199 K/UL (ref 150–400)
PMV BLD AUTO: 11.6 FL (ref 8.9–12.9)
POTASSIUM SERPL-SCNC: 4.2 MMOL/L (ref 3.5–5.1)
PROT SERPL-MCNC: 6.5 G/DL (ref 6.4–8.2)
RBC # BLD AUTO: 3.99 M/UL (ref 4.1–5.7)
SODIUM SERPL-SCNC: 141 MMOL/L (ref 136–145)
WBC # BLD AUTO: 4.3 K/UL (ref 4.1–11.1)

## 2024-12-08 PROBLEM — R79.89 ELEVATED TROPONIN: Status: RESOLVED | Noted: 2024-11-08 | Resolved: 2024-12-08

## 2024-12-21 ENCOUNTER — HOSPITAL ENCOUNTER (OUTPATIENT)
Facility: HOSPITAL | Age: 79
Setting detail: SPECIMEN
Discharge: HOME | End: 2024-12-24

## 2024-12-22 ENCOUNTER — HOSPITAL ENCOUNTER (OUTPATIENT)
Facility: HOSPITAL | Age: 79
Setting detail: SPECIMEN
Discharge: HOME | End: 2024-12-25
Payer: COMMERCIAL

## 2024-12-22 PROCEDURE — 87086 URINE CULTURE/COLONY COUNT: CPT

## 2024-12-23 LAB
BACTERIA SPEC CULT: NORMAL
SERVICE CMNT-IMP: NORMAL

## 2025-01-20 ENCOUNTER — HOSPITAL ENCOUNTER (OUTPATIENT)
Facility: HOSPITAL | Age: 80
Setting detail: SPECIMEN
Discharge: HOME | End: 2025-01-23
Payer: COMMERCIAL

## 2025-01-20 PROCEDURE — 87086 URINE CULTURE/COLONY COUNT: CPT

## 2025-01-21 LAB
BACTERIA SPEC CULT: NORMAL
SERVICE CMNT-IMP: NORMAL